# Patient Record
Sex: FEMALE | Race: WHITE | NOT HISPANIC OR LATINO | Employment: FULL TIME | URBAN - METROPOLITAN AREA
[De-identification: names, ages, dates, MRNs, and addresses within clinical notes are randomized per-mention and may not be internally consistent; named-entity substitution may affect disease eponyms.]

---

## 2017-10-03 ENCOUNTER — OFFICE VISIT (OUTPATIENT)
Dept: URGENT CARE | Facility: CLINIC | Age: 52
End: 2017-10-03
Payer: COMMERCIAL

## 2017-10-03 VITALS
DIASTOLIC BLOOD PRESSURE: 90 MMHG | SYSTOLIC BLOOD PRESSURE: 150 MMHG | RESPIRATION RATE: 28 BRPM | WEIGHT: 293 LBS | HEART RATE: 111 BPM | TEMPERATURE: 99 F | BODY MASS INDEX: 51.91 KG/M2 | HEIGHT: 63 IN | OXYGEN SATURATION: 92 %

## 2017-10-03 DIAGNOSIS — J45.909 MILD ASTHMA WITHOUT COMPLICATION, UNSPECIFIED WHETHER PERSISTENT: ICD-10-CM

## 2017-10-03 DIAGNOSIS — J40 BRONCHITIS: ICD-10-CM

## 2017-10-03 DIAGNOSIS — I10 ESSENTIAL HYPERTENSION: ICD-10-CM

## 2017-10-03 DIAGNOSIS — H66.001 ACUTE SUPPURATIVE OTITIS MEDIA OF RIGHT EAR WITHOUT SPONTANEOUS RUPTURE OF TYMPANIC MEMBRANE, RECURRENCE NOT SPECIFIED: ICD-10-CM

## 2017-10-03 DIAGNOSIS — Z76.0 MEDICATION REFILL: ICD-10-CM

## 2017-10-03 PROCEDURE — 99204 OFFICE O/P NEW MOD 45 MIN: CPT | Performed by: PHYSICIAN ASSISTANT

## 2017-10-03 RX ORDER — ALBUTEROL SULFATE 90 UG/1
2 AEROSOL, METERED RESPIRATORY (INHALATION) EVERY 6 HOURS PRN
Qty: 8.5 G | Refills: 0 | Status: SHIPPED | OUTPATIENT
Start: 2017-10-03 | End: 2017-10-16 | Stop reason: SDUPTHER

## 2017-10-03 RX ORDER — TRIAMTERENE AND HYDROCHLOROTHIAZIDE 37.5; 25 MG/1; MG/1
1 CAPSULE ORAL EVERY MORNING
Qty: 30 CAP | Refills: 0 | Status: SHIPPED | OUTPATIENT
Start: 2017-10-03 | End: 2017-10-16 | Stop reason: SDUPTHER

## 2017-10-03 RX ORDER — CARVEDILOL 3.12 MG/1
3.12 TABLET ORAL 2 TIMES DAILY WITH MEALS
COMMUNITY
End: 2017-10-16

## 2017-10-03 RX ORDER — LISINOPRIL 5 MG/1
5 TABLET ORAL DAILY
COMMUNITY
End: 2017-10-16

## 2017-10-03 RX ORDER — LISINOPRIL 5 MG/1
5 TABLET ORAL DAILY
Qty: 30 TAB | Refills: 0 | Status: SHIPPED | OUTPATIENT
Start: 2017-10-03 | End: 2017-10-16 | Stop reason: SDUPTHER

## 2017-10-03 RX ORDER — LISINOPRIL 10 MG/1
10 TABLET ORAL DAILY
Qty: 30 TAB | Refills: 0 | Status: SHIPPED | OUTPATIENT
Start: 2017-10-03 | End: 2017-10-16

## 2017-10-03 RX ORDER — TRIAMTERENE AND HYDROCHLOROTHIAZIDE 37.5; 25 MG/1; MG/1
1 CAPSULE ORAL EVERY MORNING
COMMUNITY
End: 2017-10-16

## 2017-10-03 RX ORDER — AZITHROMYCIN 250 MG/1
TABLET, FILM COATED ORAL
Qty: 6 TAB | Refills: 0 | Status: SHIPPED | OUTPATIENT
Start: 2017-10-03 | End: 2017-10-16

## 2017-10-03 RX ORDER — CARVEDILOL 3.12 MG/1
3.12 TABLET ORAL 2 TIMES DAILY WITH MEALS
Qty: 60 TAB | Refills: 0 | Status: SHIPPED | OUTPATIENT
Start: 2017-10-03 | End: 2017-10-16 | Stop reason: SDUPTHER

## 2017-10-03 ASSESSMENT — ENCOUNTER SYMPTOMS
ABDOMINAL PAIN: 0
VOMITING: 0
EYE DISCHARGE: 0
DIARRHEA: 0
CHILLS: 0
SINUS PAIN: 1
EYE REDNESS: 0
SORE THROAT: 1
HEADACHES: 1
COUGH: 1
DIZZINESS: 0
FEVER: 0
WHEEZING: 1
SHORTNESS OF BREATH: 0
SPUTUM PRODUCTION: 0
RHINORRHEA: 1
MYALGIAS: 0
TINGLING: 0
NECK PAIN: 0

## 2017-10-03 NOTE — PROGRESS NOTES
"Subjective:      Salome Garces is a 52 y.o. female who presents with Chest Pain (tightness); Headache; Cough; and Medication Refill (lisinopril)            Pt is 53 y/o female who presents with recent cough and congestion- worsening for the last 5-6 days. Pt. Reports slight wheezing at night. She has several ill contacts at work.       Of note pt. Has significanat PMH- enlarged heart with murmur- denies any CHF. She has been without her diuretic meds for over 6 months. She denies any significant leg swelling at this time. She reports that she also needs a refill on her Lisinopril for HTN along with Coreg for \"enlarged heart\".   She reports hx of asthma along with sleep apnea- she wears a CPAP at night with 2 L. Of oxygen.   She has been using this at night without difficulty.         URI    This is a new problem. Episode onset: 5-6 days ago. The problem has been gradually worsening. There has been no fever. Associated symptoms include congestion, coughing, ear pain, headaches, a plugged ear sensation, rhinorrhea, sinus pain, a sore throat and wheezing. Pertinent negatives include no abdominal pain, chest pain, diarrhea, dysuria, neck pain, rash or vomiting. Associated symptoms comments: Pos. For chest tightness.   . Treatments tried: Essential oils, CPAP with O2.  The treatment provided mild relief.       Review of Systems   Constitutional: Positive for malaise/fatigue. Negative for chills and fever.   HENT: Positive for congestion, ear pain, rhinorrhea and sore throat. Negative for ear discharge.    Eyes: Negative for discharge and redness.   Respiratory: Positive for cough and wheezing. Negative for sputum production and shortness of breath.    Cardiovascular: Negative for chest pain and leg swelling.   Gastrointestinal: Negative for abdominal pain, diarrhea and vomiting.   Genitourinary: Negative for dysuria and urgency.   Musculoskeletal: Negative for myalgias and neck pain.   Skin: Negative for itching and rash. " "  Neurological: Positive for headaches. Negative for dizziness and tingling.          Objective:     /90   Pulse (!) 111   Temp 37.2 °C (99 °F)   Resp (!) 28   Ht 1.588 m (5' 2.5\")   Wt (!) 136.1 kg (300 lb)   SpO2 92%   Breastfeeding? No   BMI 54.00 kg/m²    PMH:  has no past medical history on file.  MEDS:   Current Outpatient Prescriptions:   •  lisinopril (PRINIVIL) 5 MG Tab, Take 5 mg by mouth every day., Disp: , Rfl:   •  triamterene/hctz (MAXZIDE-25/DYAZIDE) 37.5-25 MG Cap, Take 1 Cap by mouth every morning., Disp: , Rfl:   •  carvedilol (COREG) 3.125 MG Tab, Take 3.125 mg by mouth 2 times a day, with meals., Disp: , Rfl:   •  triamterene/hctz (MAXZIDE-25/DYAZIDE) 37.5-25 MG Cap, Take 1 Cap by mouth every morning., Disp: 30 Cap, Rfl: 0  •  carvedilol (COREG) 3.125 MG Tab, Take 1 Tab by mouth 2 times a day, with meals., Disp: 60 Tab, Rfl: 0  •  lisinopril (PRINIVIL) 10 MG Tab, Take 1 Tab by mouth every day., Disp: 30 Tab, Rfl: 0  •  lisinopril (PRINIVIL) 5 MG Tab, Take 1 Tab by mouth every day., Disp: 30 Tab, Rfl: 0  •  azithromycin (ZITHROMAX) 250 MG Tab, Take 2 tabs daily, then 1 tabs daily til completed, Disp: 6 Tab, Rfl: 0  •  albuterol 108 (90 Base) MCG/ACT Aero Soln inhalation aerosol, Inhale 2 Puffs by mouth every 6 hours as needed for Shortness of Breath., Disp: 8.5 g, Rfl: 0  ALLERGIES: No Known Allergies  SURGHX: No past surgical history on file.  SOCHX:  reports that she has never smoked. She has never used smokeless tobacco.  FH: Family history was reviewed, no pertinent findings to report    Physical Exam   Constitutional: She is oriented to person, place, and time. She appears well-developed and well-nourished.   HENT:   Head: Normocephalic and atraumatic.   Mouth/Throat: No oropharyngeal exudate.   Ears- Right TM with Erythema and noted purulent middle ear effusion. TM is intact.   Pos. PND, with slight erythema- without tonsillar edema or exudate. Upper plate noted.   Mild " discharge noted bilaterally- to nares.      Eyes: EOM are normal. Pupils are equal, round, and reactive to light.   Neck: Normal range of motion. Neck supple.   Cardiovascular: Normal rate and regular rhythm.    Murmur heard.  Pulse slower on exam than on triage- closer to 90.    Pulmonary/Chest: Effort normal and breath sounds normal. No respiratory distress.   Diminished at the bases.    Musculoskeletal: Normal range of motion. She exhibits no edema or tenderness.   Lymphadenopathy:     She has no cervical adenopathy.   Neurological: She is alert and oriented to person, place, and time. Coordination normal.   Skin: Skin is warm. Capillary refill takes 2 to 3 seconds. No rash noted.   Psychiatric: She has a normal mood and affect. Her behavior is normal. Judgment and thought content normal.   Vitals reviewed.              Assessment/Plan:     1. Essential hypertension  - triamterene/hctz (MAXZIDE-25/DYAZIDE) 37.5-25 MG Cap; Take 1 Cap by mouth every morning.  Dispense: 30 Cap; Refill: 0  - carvedilol (COREG) 3.125 MG Tab; Take 1 Tab by mouth 2 times a day, with meals.  Dispense: 60 Tab; Refill: 0  - lisinopril (PRINIVIL) 10 MG Tab; Take 1 Tab by mouth every day.  Dispense: 30 Tab; Refill: 0  - lisinopril (PRINIVIL) 5 MG Tab; Take 1 Tab by mouth every day.  Dispense: 30 Tab; Refill: 0  She is to start  On BP meds ASAP- monitor for symptoms- currently without change to vision, current HA, or CP.     2. Medication refill  - lisinopril (PRINIVIL) 5 MG Tab; Take 1 Tab by mouth every day.  Dispense: 30 Tab; Refill: 0    3. Bronchitis  - azithromycin (ZITHROMAX) 250 MG Tab; Take 2 tabs daily, then 1 tabs daily til completed  Dispense: 6 Tab; Refill: 0  - albuterol 108 (90 Base) MCG/ACT Aero Soln inhalation aerosol; Inhale 2 Puffs by mouth every 6 hours as needed for Shortness of Breath.  Dispense: 8.5 g; Refill: 0    4. Acute suppurative otitis media of right ear without spontaneous rupture of tympanic membrane,  recurrence not specified  - azithromycin (ZITHROMAX) 250 MG Tab; Take 2 tabs daily, then 1 tabs daily til completed  Dispense: 6 Tab; Refill: 0    5. Mild asthma without complication, unspecified whether persistent  - albuterol 108 (90 Base) MCG/ACT Aero Soln inhalation aerosol; Inhale 2 Puffs by mouth every 6 hours as needed for Shortness of Breath.  Dispense: 8.5 g; Refill: 0    At this time will cover her Bronchitis and OM with Azithromycin- she denies any renal or liver issues.   Pt. Was given appt. With PCP- 10/16 to have follow up and establish care.   Pt. Is to utilize albuterol for chest tightness and wheezing- none heard on exam today.   Avoid night time dairy,  Continue use of night time home O2_ I do suspect that due to habitus/sleep apnea/ need to utilize night time patient's baseline O2 I suspect is in the lower 90's- pt. Reports hx of this in the past. Without evidence of PNE, without orthopnea or increased edema- Acute CHF is unlikely.   Patient given precautionary s/sx that mandate immediate follow up and evaluation in the ED. Advised of risks of not doing so.    DDX, Supportive care, and indications for immediate follow-up discussed with patient.    Instructed to return to clinic or nearest emergency department if we are not available for any change in condition, further concerns, or worsening of symptoms.    The patient demonstrated a good understanding and agreed with the treatment plan.

## 2017-10-03 NOTE — LETTER
October 3, 2017         Patient: Salome Garces   YOB: 1965   Date of Visit: 10/3/2017           To Whom it May Concern:    Salome Garces was seen in my clinic on 10/3/2017. Please excuse this patient from work today due to recent illness, she may return on Thursday 10/5 only if symptoms have improved.     If you have any questions or concerns, please don't hesitate to call.        Sincerely,           Epifanio Bedoya P.A.-C.  Electronically Signed

## 2017-10-16 ENCOUNTER — HOSPITAL ENCOUNTER (OUTPATIENT)
Dept: LAB | Facility: MEDICAL CENTER | Age: 52
End: 2017-10-16
Attending: NURSE PRACTITIONER
Payer: COMMERCIAL

## 2017-10-16 ENCOUNTER — OFFICE VISIT (OUTPATIENT)
Dept: MEDICAL GROUP | Facility: PHYSICIAN GROUP | Age: 52
End: 2017-10-16
Payer: COMMERCIAL

## 2017-10-16 VITALS
DIASTOLIC BLOOD PRESSURE: 70 MMHG | OXYGEN SATURATION: 94 % | SYSTOLIC BLOOD PRESSURE: 126 MMHG | BODY MASS INDEX: 51.91 KG/M2 | RESPIRATION RATE: 18 BRPM | WEIGHT: 293 LBS | TEMPERATURE: 98.7 F | HEIGHT: 63 IN | HEART RATE: 76 BPM

## 2017-10-16 DIAGNOSIS — Z12.11 ENCOUNTER FOR SCREENING COLONOSCOPY: ICD-10-CM

## 2017-10-16 DIAGNOSIS — J45.20 MILD INTERMITTENT ASTHMA WITHOUT COMPLICATION: ICD-10-CM

## 2017-10-16 DIAGNOSIS — R01.1 MURMUR: ICD-10-CM

## 2017-10-16 DIAGNOSIS — I10 ESSENTIAL HYPERTENSION: ICD-10-CM

## 2017-10-16 DIAGNOSIS — G47.33 OBSTRUCTIVE SLEEP APNEA SYNDROME: ICD-10-CM

## 2017-10-16 DIAGNOSIS — I51.7 ENLARGED HEART: ICD-10-CM

## 2017-10-16 DIAGNOSIS — D50.8 IRON DEFICIENCY ANEMIA SECONDARY TO INADEQUATE DIETARY IRON INTAKE: ICD-10-CM

## 2017-10-16 DIAGNOSIS — Z12.39 SCREENING FOR BREAST CANCER: ICD-10-CM

## 2017-10-16 DIAGNOSIS — Z23 NEED FOR VACCINATION: ICD-10-CM

## 2017-10-16 DIAGNOSIS — E66.01 MORBID OBESITY WITH BMI OF 50.0-59.9, ADULT (HCC): ICD-10-CM

## 2017-10-16 LAB
ALBUMIN SERPL BCP-MCNC: 3.9 G/DL (ref 3.2–4.9)
ALBUMIN/GLOB SERPL: 1.1 G/DL
ALP SERPL-CCNC: 80 U/L (ref 30–99)
ALT SERPL-CCNC: 35 U/L (ref 2–50)
ANION GAP SERPL CALC-SCNC: 10 MMOL/L (ref 0–11.9)
AST SERPL-CCNC: 34 U/L (ref 12–45)
BASOPHILS # BLD AUTO: 1 % (ref 0–1.8)
BASOPHILS # BLD: 0.09 K/UL (ref 0–0.12)
BILIRUB SERPL-MCNC: 0.6 MG/DL (ref 0.1–1.5)
BUN SERPL-MCNC: 14 MG/DL (ref 8–22)
CALCIUM SERPL-MCNC: 9.6 MG/DL (ref 8.5–10.5)
CHLORIDE SERPL-SCNC: 97 MMOL/L (ref 96–112)
CHOLEST SERPL-MCNC: 180 MG/DL (ref 100–199)
CO2 SERPL-SCNC: 29 MMOL/L (ref 20–33)
CREAT SERPL-MCNC: 0.51 MG/DL (ref 0.5–1.4)
CREAT UR-MCNC: 121.7 MG/DL
EOSINOPHIL # BLD AUTO: 0.18 K/UL (ref 0–0.51)
EOSINOPHIL NFR BLD: 2 % (ref 0–6.9)
ERYTHROCYTE [DISTWIDTH] IN BLOOD BY AUTOMATED COUNT: 46.4 FL (ref 35.9–50)
GFR SERPL CREATININE-BSD FRML MDRD: >60 ML/MIN/1.73 M 2
GLOBULIN SER CALC-MCNC: 3.5 G/DL (ref 1.9–3.5)
GLUCOSE SERPL-MCNC: 79 MG/DL (ref 65–99)
HCT VFR BLD AUTO: 45 % (ref 37–47)
HDLC SERPL-MCNC: 54 MG/DL
HGB BLD-MCNC: 14.2 G/DL (ref 12–16)
IMM GRANULOCYTES # BLD AUTO: 0.03 K/UL (ref 0–0.11)
IMM GRANULOCYTES NFR BLD AUTO: 0.3 % (ref 0–0.9)
LDLC SERPL CALC-MCNC: 106 MG/DL
LYMPHOCYTES # BLD AUTO: 1.8 K/UL (ref 1–4.8)
LYMPHOCYTES NFR BLD: 19.8 % (ref 22–41)
MCH RBC QN AUTO: 29.4 PG (ref 27–33)
MCHC RBC AUTO-ENTMCNC: 31.6 G/DL (ref 33.6–35)
MCV RBC AUTO: 93.2 FL (ref 81.4–97.8)
MICROALBUMIN UR-MCNC: 0.7 MG/DL
MICROALBUMIN/CREAT UR: 6 MG/G (ref 0–30)
MONOCYTES # BLD AUTO: 0.52 K/UL (ref 0–0.85)
MONOCYTES NFR BLD AUTO: 5.7 % (ref 0–13.4)
NEUTROPHILS # BLD AUTO: 6.48 K/UL (ref 2–7.15)
NEUTROPHILS NFR BLD: 71.2 % (ref 44–72)
NRBC # BLD AUTO: 0 K/UL
NRBC BLD AUTO-RTO: 0 /100 WBC
PLATELET # BLD AUTO: 300 K/UL (ref 164–446)
PMV BLD AUTO: 11.3 FL (ref 9–12.9)
POTASSIUM SERPL-SCNC: 4.2 MMOL/L (ref 3.6–5.5)
PROT SERPL-MCNC: 7.4 G/DL (ref 6–8.2)
RBC # BLD AUTO: 4.83 M/UL (ref 4.2–5.4)
SODIUM SERPL-SCNC: 136 MMOL/L (ref 135–145)
TRIGL SERPL-MCNC: 100 MG/DL (ref 0–149)
WBC # BLD AUTO: 9.1 K/UL (ref 4.8–10.8)

## 2017-10-16 PROCEDURE — 90686 IIV4 VACC NO PRSV 0.5 ML IM: CPT | Performed by: NURSE PRACTITIONER

## 2017-10-16 PROCEDURE — 80061 LIPID PANEL: CPT

## 2017-10-16 PROCEDURE — 99214 OFFICE O/P EST MOD 30 MIN: CPT | Mod: 25 | Performed by: NURSE PRACTITIONER

## 2017-10-16 PROCEDURE — 90732 PPSV23 VACC 2 YRS+ SUBQ/IM: CPT | Performed by: NURSE PRACTITIONER

## 2017-10-16 PROCEDURE — 36415 COLL VENOUS BLD VENIPUNCTURE: CPT

## 2017-10-16 PROCEDURE — 90471 IMMUNIZATION ADMIN: CPT | Performed by: NURSE PRACTITIONER

## 2017-10-16 PROCEDURE — 82570 ASSAY OF URINE CREATININE: CPT

## 2017-10-16 PROCEDURE — 85025 COMPLETE CBC W/AUTO DIFF WBC: CPT

## 2017-10-16 PROCEDURE — 80053 COMPREHEN METABOLIC PANEL: CPT

## 2017-10-16 PROCEDURE — 90472 IMMUNIZATION ADMIN EACH ADD: CPT | Performed by: NURSE PRACTITIONER

## 2017-10-16 PROCEDURE — 82043 UR ALBUMIN QUANTITATIVE: CPT

## 2017-10-16 RX ORDER — ALBUTEROL SULFATE 90 UG/1
2 AEROSOL, METERED RESPIRATORY (INHALATION) EVERY 6 HOURS PRN
Qty: 8.5 G | Refills: 6 | Status: SHIPPED | OUTPATIENT
Start: 2017-10-16 | End: 2018-10-15 | Stop reason: SDUPTHER

## 2017-10-16 RX ORDER — LISINOPRIL 5 MG/1
5 TABLET ORAL DAILY
Qty: 90 TAB | Refills: 3 | Status: SHIPPED | OUTPATIENT
Start: 2017-10-16 | End: 2018-01-15 | Stop reason: SDUPTHER

## 2017-10-16 RX ORDER — TRIAMTERENE AND HYDROCHLOROTHIAZIDE 37.5; 25 MG/1; MG/1
1 CAPSULE ORAL EVERY MORNING
Qty: 90 CAP | Refills: 3 | Status: SHIPPED | OUTPATIENT
Start: 2017-10-16 | End: 2018-10-15 | Stop reason: SDUPTHER

## 2017-10-16 RX ORDER — ALBUTEROL SULFATE 90 UG/1
2 AEROSOL, METERED RESPIRATORY (INHALATION) EVERY 6 HOURS PRN
Qty: 8.5 G | Refills: 0 | Status: SHIPPED | OUTPATIENT
Start: 2017-10-16 | End: 2017-10-16 | Stop reason: SDUPTHER

## 2017-10-16 RX ORDER — CARVEDILOL 3.12 MG/1
3.12 TABLET ORAL 2 TIMES DAILY WITH MEALS
Qty: 180 TAB | Refills: 3 | Status: SHIPPED | OUTPATIENT
Start: 2017-10-16 | End: 2017-12-04

## 2017-10-16 RX ORDER — TRIAMTERENE AND HYDROCHLOROTHIAZIDE 37.5; 25 MG/1; MG/1
1 CAPSULE ORAL EVERY MORNING
Qty: 30 CAP | Refills: 0 | Status: SHIPPED | OUTPATIENT
Start: 2017-10-16 | End: 2017-10-16 | Stop reason: SDUPTHER

## 2017-10-16 RX ORDER — LISINOPRIL 5 MG/1
5 TABLET ORAL DAILY
Qty: 30 TAB | Refills: 0 | Status: SHIPPED | OUTPATIENT
Start: 2017-10-16 | End: 2017-10-16 | Stop reason: SDUPTHER

## 2017-10-16 RX ORDER — CARVEDILOL 3.12 MG/1
3.12 TABLET ORAL 2 TIMES DAILY WITH MEALS
Qty: 60 TAB | Refills: 0 | Status: SHIPPED | OUTPATIENT
Start: 2017-10-16 | End: 2017-10-16 | Stop reason: SDUPTHER

## 2017-10-16 ASSESSMENT — PAIN SCALES - GENERAL: PAINLEVEL: NO PAIN

## 2017-10-16 ASSESSMENT — PATIENT HEALTH QUESTIONNAIRE - PHQ9: CLINICAL INTERPRETATION OF PHQ2 SCORE: 0

## 2017-10-16 NOTE — ASSESSMENT & PLAN NOTE
Chronic in nature. Stable. Patient uses CPAP with oxygen. Patient has not had recent follow-up with the provider regarding this issue patient has not seen pulmonology in the past. Patient does not have a record of her recent sleep study. Referral to pulmonology.

## 2017-10-16 NOTE — PROGRESS NOTES
Chief Complaint   Patient presents with   • Establish Care     New Patient    • Medication Refill       HISTORY OF THE PRESENT ILLNESS: This is a 52 y.o. female new patient to our practice. This pleasant patient is here todayTo establish care.    Essential hypertension  Chronic in nature. Stable. BP today is 126/70. Patient denies chest pain, palpitations, dizziness, SOB, headaches. Continues taking lisinopril, triamterene/HCTZ, carvedilol, states that she isn't having side effects.     Enlarged heart  Chronic in nature. Stable. Patient is unsure of exact enlargement, plan to obtain records.     Mild intermittent asthma without complication  Chronic in nature. Stable. Using albuterol inhaler. Denies current SOB, cough, or nighttime waking.     Iron deficiency anemia secondary to inadequate dietary iron intake  Chronic in nature. Stable. Takes OTC iron daily.     Murmur  Chronic in nature. Stable. Will obtain records, patient is uncertain what type of murmur.     Obstructive sleep apnea syndrome  Chronic in nature. Stable. Patient uses CPAP with oxygen. Patient has not had recent follow-up with the provider regarding this issue patient has not seen pulmonology in the past. Patient does not have a record of her recent sleep study. Referral to pulmonology.      Past Medical History:   Diagnosis Date   • Allergy    • Asthma    • Enlarged heart    • Hypertension    • Sleep apnea        Past Surgical History:   Procedure Laterality Date   • CARDIAC CATH, RIGHT/LEFT HEART     • PRIMARY C SECTION     • REPEAT C SECTION     • TUBAL COAGULATION LAPAROSCOPIC BILATERAL         Family Status   Relation Status   • Mother    • Father    • Maternal Aunt    • Maternal Grandmother      Family History   Problem Relation Age of Onset   • Lung Disease Mother      COPD    • Arthritis Mother    • Psychiatry Mother      depression, suicide   • Cancer Father    • Other Father      hypoglycemia   • Heart Disease Maternal Aunt  "64   • Cancer Maternal Aunt      breast cancer   • Heart Disease Maternal Grandmother      pacer       Social History   Substance Use Topics   • Smoking status: Never Smoker   • Smokeless tobacco: Never Used   • Alcohol use No       Allergies: Celebrex [celecoxib] and Penicillins    Current Outpatient Prescriptions Ordered in [facility]   Medication Sig Dispense Refill   • albuterol 108 (90 Base) MCG/ACT Aero Soln inhalation aerosol Inhale 2 Puffs by mouth every 6 hours as needed for Shortness of Breath. 8.5 g 6   • lisinopril (PRINIVIL) 5 MG Tab Take 1 Tab by mouth every day. 90 Tab 3   • carvedilol (COREG) 3.125 MG Tab Take 1 Tab by mouth 2 times a day, with meals. 180 Tab 3   • triamterene/hctz (MAXZIDE-25/DYAZIDE) 37.5-25 MG Cap Take 1 Cap by mouth every morning. 90 Cap 3     No current Epic-ordered facility-administered medications on file.        Review of Systems   Constitutional:  Negative for fever, chills, weight loss and malaise/fatigue.   HENT:  Negative for ear pain, nosebleeds, congestion, sore throat and neck pain.    Eyes:  Negative for blurred vision.   Respiratory:  Negative for cough, sputum production, shortness of breath and wheezing.    Cardiovascular:  Negative for chest pain, palpitations, orthopnea and leg swelling.   Gastrointestinal:  Negative for heartburn, nausea, vomiting and abdominal pain.   Genitourinary:  Negative for dysuria, urgency and frequency.   Musculoskeletal:  Negative for myalgias, back pain and joint pain.   Skin:  Negative for rash and itching.   Neurological:  Negative for dizziness, tingling, tremors, sensory change, focal weakness and headaches.   Endo/Heme/Allergies:  Does not bruise/bleed easily.   Psychiatric/Behavioral:  Negative for depression, anxiety, or memory loss.     All other systems reviewed and are negative except as in HPI.    Exam: Blood pressure 126/70, pulse 76, temperature 37.1 °C (98.7 °F), resp. rate 18, height 1.588 m (5' 2.52\"), weight (!) 136.5 kg " (301 lb), last menstrual period 09/20/2017, SpO2 94 %, not currently breastfeeding.  General:  Normal appearing. No distress.  HEENT:  Normocephalic. Eyes conjunctiva clear lids without ptosis, pupils equal and reactive to light accommodation, ears normal shape and contour, canals are clear bilaterally, tympanic membranes are benign, nasal mucosa benign, oropharynx is without erythema, edema or exudates.   Neck:  Supple without JVD or bruit. Thyroid is not enlarged.  Pulmonary:  Clear to ausculation.  Normal effort. No rales, ronchi, or wheezing.  Cardiovascular:  Regular rate and rhythm with murmur. Carotid and radial pulses are intact and equal bilaterally.  Abdomen:  Soft, nontender, nondistended. Normal bowel sounds. Liver and spleen are not palpable. Exam limited by body habitus.  Neurologic:  Grossly nonfocal  Lymph:  No cervical, supraclavicular or axillary lymph nodes are palpable  Skin:  Warm and dry.  No obvious lesions.  Musculoskeletal:  Normal gait. No extremity cyanosis, clubbing, or edema.  Psych:  Normal mood and affect. Alert and oriented x3. Judgment and insight is normal.    PLAN:    1. Need for vaccination  - Flu Quad Inj >3 Year Pre-Filled (Preservative Free)  - PneumoVax PPV23 =>4yo    2. Mild intermittent asthma without complication  Continue using albuterol inhaler as needed.  Counseled patient regarding exacerbation, went to be seen.  Discussed PFTs patient refuses at this time.  - PneumoVax PPV23 =>4yo  - albuterol 108 (90 Base) MCG/ACT Aero Soln inhalation aerosol; Inhale 2 Puffs by mouth every 6 hours as needed for Shortness of Breath.  Dispense: 8.5 g; Refill: 6    3. Essential hypertension  Continue current medications. Labs are ordered for annual assessment.  Plan to obtain records from previous provider.  - COMP METABOLIC PANEL; Future  - LIPID PROFILE; Future  - MICROALBUMIN CREAT RATIO URINE; Future  - lisinopril (PRINIVIL) 5 MG Tab; Take 1 Tab by mouth every day.  Dispense: 90 Tab;  Refill: 3  - carvedilol (COREG) 3.125 MG Tab; Take 1 Tab by mouth 2 times a day, with meals.  Dispense: 180 Tab; Refill: 3  - triamterene/hctz (MAXZIDE-25/DYAZIDE) 37.5-25 MG Cap; Take 1 Cap by mouth every morning.  Dispense: 90 Cap; Refill: 3    4. Enlarged heart  To obtain records from previous provider including previous echo patient may need to be followed by cardiology but has not seen a cardiologist in the past.    5. Morbid obesity with BMI of 50.0-59.9, adult (CMS-[facility])  - Patient identified as having weight management issue.  Appropriate orders and counseling given.    6. Iron deficiency anemia secondary to inadequate dietary iron intake  Chronic in nature. Patient is taking over-the-counter iron continue current plan of care and repeat labs.  - CBC WITH DIFFERENTIAL; Future    7. Murmur  Plan to obtain records from previous provider.    8. Screening for breast cancer  - MA-SCREEN MAMMO W/CAD-BILAT    9. Encounter for screening colonoscopy  - REFERRAL TO GI FOR COLONOSCOPY    10. Obstructive sleep apnea syndrome  Continue CPAP, oxygen at night.  Obtain records from previous provider.  - REFERRAL TO PULMONOLOGY    She will follow up in 6 months for short appointment or sooner as needed. Will obtain records to determine if patient is due for Pap smear at this time. Patient is encouraged to be seen in the emergency room for chest pain, palpitations, shortness of breath, dizziness, severe abdominal pain or other concerning symptoms.    Please note that this dictation was created using voice recognition software. I have made every reasonable attempt to correct obvious errors, but I expect that there are errors of grammar and possibly content that I did not discover before finalizing the note.      Assessment/Plan  1. Need for vaccination  Flu Quad Inj >3 Year Pre-Filled (Preservative Free)    PneumoVax PPV23 =>4yo   2. Mild intermittent asthma without complication  PneumoVax PPV23 =>4yo    albuterol 108 (90 Base)  MCG/ACT Aero Soln inhalation aerosol    DISCONTINUED: albuterol 108 (90 Base) MCG/ACT Aero Soln inhalation aerosol   3. Essential hypertension  COMP METABOLIC PANEL    LIPID PROFILE    MICROALBUMIN CREAT RATIO URINE    lisinopril (PRINIVIL) 5 MG Tab    carvedilol (COREG) 3.125 MG Tab    triamterene/hctz (MAXZIDE-25/DYAZIDE) 37.5-25 MG Cap    DISCONTINUED: lisinopril (PRINIVIL) 5 MG Tab    DISCONTINUED: carvedilol (COREG) 3.125 MG Tab    DISCONTINUED: triamterene/hctz (MAXZIDE-25/DYAZIDE) 37.5-25 MG Cap   4. Enlarged heart     5. Morbid obesity with BMI of 50.0-59.9, adult (CMS-[facility])  Patient identified as having weight management issue.  Appropriate orders and counseling given.   6. Iron deficiency anemia secondary to inadequate dietary iron intake  CBC WITH DIFFERENTIAL   7. Murmur     8. Screening for breast cancer  MA-SCREEN MAMMO W/CAD-BILAT   9. Encounter for screening colonoscopy  REFERRAL TO GI FOR COLONOSCOPY   10. Obstructive sleep apnea syndrome  REFERRAL TO PULMONOLOGY         I have placed the below orders and discussed them with an approved delegating provider. The MA is performing the below orders under the direction of Dr. Forbes.

## 2017-10-16 NOTE — ASSESSMENT & PLAN NOTE
Chronic in nature. Stable. BP today is 126/70. Patient denies chest pain, palpitations, dizziness, SOB, headaches. Continues taking lisinopril, triamterene/HCTZ, carvedilol, states that she isn't having side effects.

## 2017-10-16 NOTE — LETTER
Kliqed  GLENNY MarrP.RSABRA  [address]  [place] [address]  Fax: [phone]   Authorization for Release/Disclosure of   Protected Health Information   Name: SALOME GARCES : 1965 SSN: [id number]   Address: [address] [place] [address] [address]  [place] [address] Phone:    [phone] (home)    I authorize the entity listed below to release/disclose the PHI below to:   [facility]/Epifanio Myers A.P.R.YESY. and MIGUEL ÁNGEL Marr.P.RTRISTA.   Provider or Entity Name:  Dr.James Ordonez       Address   City, [facility], [facility]   Phone:      Fax:     Reason for request: continuity of care   Information to be released:    [  ] LAST COLONOSCOPY,  including any PATH REPORT and follow-up  [  ] LAST FIT/COLOGUARD RESULT [  ] LAST DEXA  [  ] LAST MAMMOGRAM  [  ] LAST PAP  [  ] LAST LABS [  ] RETINA EXAM REPORT  [  ] IMMUNIZATION RECORDS  [X  ] Release all info      [  ] Check here and initial the line next to each item to release ALL health information INCLUDING  _____ Care and treatment for drug and / or alcohol abuse  _____ HIV testing, infection status, or AIDS  _____ Genetic Testing    DATES OF SERVICE OR TIME PERIOD TO BE DISCLOSED: _____________  I understand and acknowledge that:  * This Authorization may be revoked at any time by you in writing, except if your health information has already been used or disclosed.  * Your health information that will be used or disclosed as a result of you signing this authorization could be re-disclosed by the recipient. If this occurs, your re-disclosed health information may no longer be protected by State or Federal laws.  * You may refuse to sign this Authorization. Your refusal will not affect your ability to obtain treatment.  * This Authorization becomes effective upon signing and will  on (date) __________.      If no date is indicated, this Authorization will  one (1) year from the signature date.    Name: Salome Garces    Signature:   Date:      10/16/2017       PLEASE FAX REQUESTED RECORDS BACK TO: [phone]

## 2017-10-16 NOTE — ASSESSMENT & PLAN NOTE
Chronic in nature. Stable. Using albuterol inhaler. Denies current SOB, cough, or nighttime waking.

## 2017-10-17 ENCOUNTER — TELEPHONE (OUTPATIENT)
Dept: MEDICAL GROUP | Facility: PHYSICIAN GROUP | Age: 52
End: 2017-10-17

## 2017-10-17 NOTE — TELEPHONE ENCOUNTER
----- Message from GLENNY MarrPAMAN sent at 10/17/2017  7:26 AM PDT -----  Please call pt and give lab results: CBC with no signs of anemia or infection. Electrolytes, kidney, liver function, proteins, fasting blood sugar all within normal limits. Liver profile is within normal limits there is slight elevation of LDL at 106 but this is not concerning at this time.

## 2017-11-13 ENCOUNTER — HOSPITAL ENCOUNTER (OUTPATIENT)
Dept: RADIOLOGY | Facility: MEDICAL CENTER | Age: 52
End: 2017-11-13
Attending: NURSE PRACTITIONER
Payer: COMMERCIAL

## 2017-11-13 PROCEDURE — G0202 SCR MAMMO BI INCL CAD: HCPCS

## 2017-11-20 ENCOUNTER — TELEPHONE (OUTPATIENT)
Dept: MEDICAL GROUP | Facility: PHYSICIAN GROUP | Age: 52
End: 2017-11-20

## 2017-11-20 ENCOUNTER — HOSPITAL ENCOUNTER (OUTPATIENT)
Dept: RADIOLOGY | Facility: MEDICAL CENTER | Age: 52
End: 2017-11-20

## 2017-11-20 NOTE — TELEPHONE ENCOUNTER
Phone Number Called: [phone] (home)       Message: Tried to call pt, unable to leave VM    Left Message for patient to call back: yes

## 2017-11-20 NOTE — TELEPHONE ENCOUNTER
----- Message from SOLO Marr sent at 11/20/2017  2:01 PM PST -----  Please call patient: Recent mammogram was normal. No signs of malignancy. I recommend re-checking in 1 year.

## 2017-12-02 DIAGNOSIS — I10 ESSENTIAL HYPERTENSION: ICD-10-CM

## 2017-12-04 RX ORDER — CARVEDILOL 3.12 MG/1
3.12 TABLET ORAL 2 TIMES DAILY WITH MEALS
Qty: 60 TAB | Refills: 0 | Status: SHIPPED | OUTPATIENT
Start: 2017-12-04 | End: 2018-01-04 | Stop reason: SDUPTHER

## 2017-12-28 DIAGNOSIS — Z01.810 PRE-OPERATIVE CARDIOVASCULAR EXAMINATION: ICD-10-CM

## 2017-12-28 DIAGNOSIS — Z01.812 PRE-OPERATIVE LABORATORY EXAMINATION: ICD-10-CM

## 2017-12-28 LAB
ANION GAP SERPL CALC-SCNC: 9 MMOL/L (ref 0–11.9)
BUN SERPL-MCNC: 12 MG/DL (ref 8–22)
CALCIUM SERPL-MCNC: 9.3 MG/DL (ref 8.5–10.5)
CHLORIDE SERPL-SCNC: 102 MMOL/L (ref 96–112)
CO2 SERPL-SCNC: 25 MMOL/L (ref 20–33)
CREAT SERPL-MCNC: 0.58 MG/DL (ref 0.5–1.4)
EKG IMPRESSION: NORMAL
ERYTHROCYTE [DISTWIDTH] IN BLOOD BY AUTOMATED COUNT: 47.2 FL (ref 35.9–50)
GFR SERPL CREATININE-BSD FRML MDRD: >60 ML/MIN/1.73 M 2
GLUCOSE SERPL-MCNC: 97 MG/DL (ref 65–99)
HCT VFR BLD AUTO: 40.5 % (ref 37–47)
HGB BLD-MCNC: 12.9 G/DL (ref 12–16)
MCH RBC QN AUTO: 29.6 PG (ref 27–33)
MCHC RBC AUTO-ENTMCNC: 31.9 G/DL (ref 33.6–35)
MCV RBC AUTO: 92.9 FL (ref 81.4–97.8)
PLATELET # BLD AUTO: 266 K/UL (ref 164–446)
PMV BLD AUTO: 10.9 FL (ref 9–12.9)
POTASSIUM SERPL-SCNC: 4 MMOL/L (ref 3.6–5.5)
RBC # BLD AUTO: 4.36 M/UL (ref 4.2–5.4)
SODIUM SERPL-SCNC: 136 MMOL/L (ref 135–145)
WBC # BLD AUTO: 7 K/UL (ref 4.8–10.8)

## 2017-12-28 PROCEDURE — 93010 ELECTROCARDIOGRAM REPORT: CPT | Performed by: INTERNAL MEDICINE

## 2017-12-28 PROCEDURE — 80048 BASIC METABOLIC PNL TOTAL CA: CPT

## 2017-12-28 PROCEDURE — 36415 COLL VENOUS BLD VENIPUNCTURE: CPT

## 2017-12-28 PROCEDURE — 85027 COMPLETE CBC AUTOMATED: CPT

## 2017-12-28 PROCEDURE — 93005 ELECTROCARDIOGRAM TRACING: CPT

## 2017-12-28 NOTE — OR NURSING
"Pre-admit appointment completed. \"Preparing for your procedure\" sheet given to pt along with verbal and written instructions. Pt instructed to continue regularly prescribed medications through the day before surgery. Pt instructed to take the following medications the day of surgery with a sip of water, per anesthesia protocol; coreg and albuterol MDI if needed.    Pt to bring MDI and CPAP DOS.    Dr Figueredo notified via email of procedure due to BMI=53.3 and other co-morbidities.   "

## 2017-12-28 NOTE — OR NURSING
Dr Figueredo reviewed patients medical history. Based upon information available, Dr Figueredo indicates OK to proceed with procedure.

## 2018-01-04 DIAGNOSIS — I10 ESSENTIAL HYPERTENSION: ICD-10-CM

## 2018-01-04 RX ORDER — CARVEDILOL 3.12 MG/1
3.12 TABLET ORAL 2 TIMES DAILY WITH MEALS
Qty: 180 TAB | Refills: 0 | Status: SHIPPED | OUTPATIENT
Start: 2018-01-04 | End: 2018-04-03 | Stop reason: SDUPTHER

## 2018-01-08 ENCOUNTER — HOSPITAL ENCOUNTER (OUTPATIENT)
Facility: MEDICAL CENTER | Age: 53
End: 2018-01-08
Attending: INTERNAL MEDICINE | Admitting: INTERNAL MEDICINE
Payer: COMMERCIAL

## 2018-01-08 VITALS
HEIGHT: 63 IN | RESPIRATION RATE: 14 BRPM | HEART RATE: 65 BPM | TEMPERATURE: 97.5 F | SYSTOLIC BLOOD PRESSURE: 117 MMHG | OXYGEN SATURATION: 94 % | BODY MASS INDEX: 51.91 KG/M2 | DIASTOLIC BLOOD PRESSURE: 65 MMHG | WEIGHT: 293 LBS

## 2018-01-08 LAB
B-HCG FREE SERPL-ACNC: <5 MIU/ML
IHCGL IHCGL: NEGATIVE MIU/ML
PATHOLOGY CONSULT NOTE: NORMAL

## 2018-01-08 PROCEDURE — 160009 HCHG ANES TIME/MIN: Performed by: INTERNAL MEDICINE

## 2018-01-08 PROCEDURE — 84702 CHORIONIC GONADOTROPIN TEST: CPT

## 2018-01-08 PROCEDURE — 160046 HCHG PACU - 1ST 60 MINS PHASE II: Performed by: INTERNAL MEDICINE

## 2018-01-08 PROCEDURE — 88305 TISSUE EXAM BY PATHOLOGIST: CPT

## 2018-01-08 PROCEDURE — 160204 HCHG ENDO MINUTES - 1ST 30 MINS LEVEL 5: Performed by: INTERNAL MEDICINE

## 2018-01-08 PROCEDURE — 160025 RECOVERY II MINUTES (STATS): Performed by: INTERNAL MEDICINE

## 2018-01-08 PROCEDURE — 160035 HCHG PACU - 1ST 60 MINS PHASE I: Performed by: INTERNAL MEDICINE

## 2018-01-08 PROCEDURE — 700111 HCHG RX REV CODE 636 W/ 250 OVERRIDE (IP)

## 2018-01-08 PROCEDURE — 700105 HCHG RX REV CODE 258: Performed by: INTERNAL MEDICINE

## 2018-01-08 PROCEDURE — 501629 HCHG TUBE, LUKI TRAP STERILE DISP: Performed by: INTERNAL MEDICINE

## 2018-01-08 PROCEDURE — 503192 HCHG GOLDPROBE, 10FR: Performed by: INTERNAL MEDICINE

## 2018-01-08 PROCEDURE — 160002 HCHG RECOVERY MINUTES (STAT): Performed by: INTERNAL MEDICINE

## 2018-01-08 PROCEDURE — 160048 HCHG OR STATISTICAL LEVEL 1-5: Performed by: INTERNAL MEDICINE

## 2018-01-08 PROCEDURE — 160036 HCHG PACU - EA ADDL 30 MINS PHASE I: Performed by: INTERNAL MEDICINE

## 2018-01-08 RX ORDER — SODIUM CHLORIDE, SODIUM LACTATE, POTASSIUM CHLORIDE, CALCIUM CHLORIDE 600; 310; 30; 20 MG/100ML; MG/100ML; MG/100ML; MG/100ML
INJECTION, SOLUTION INTRAVENOUS
Status: DISCONTINUED | OUTPATIENT
Start: 2018-01-08 | End: 2018-01-08 | Stop reason: HOSPADM

## 2018-01-08 RX ADMIN — SODIUM CHLORIDE, POTASSIUM CHLORIDE, SODIUM LACTATE AND CALCIUM CHLORIDE: 600; 310; 30; 20 INJECTION, SOLUTION INTRAVENOUS at 09:51

## 2018-01-08 ASSESSMENT — PAIN SCALES - GENERAL
PAINLEVEL_OUTOF10: 0

## 2018-01-08 NOTE — PROGRESS NOTES
Indication: + FIT   Risks, benefits, and alternatives were discussed with consenting person(s). Consenting person(s) were given an opportunity to ask questions and discuss other options. Risks including but not limited to failed or incomplete colonoscopy, ineffective therapy, perforation, infection, bleeding, missed lesion(s), missed diagnosis, cardiac and/or pulmonary event, aspiration, stroke, possible need for surgery, hospitalization possibly prolonged, discomfort, unsuccessful and/or incomplete procedure, possible need for repeat procedures and/or additional testings, damage to adjacent organs and/or vascular structures, medication reaction, disability, death, and other adverse events possibly life-threatening. Discussion was undertaken with Layman's terms. Consenting persons stated understanding and acceptance of these risks, and wished to proceed. Consent was given in clear state of mind.

## 2018-01-08 NOTE — OR NURSING
1158 pt [place] from [place] via [place], arouses upon verbal stim, on left side, noted o2 sats slightly low 90's, o2 mask replaced on 2 L. BS audible q 4 quads, pt denies any pain and noted respirations unlabored. no noted rectal bleedingRails up x2  on rney 1205 cpap per pt machine on 2 l. Nc 1225 cpap removed , pt awake alert, follows commands, resp. Unlabored sip water given. Denies pain , notified pt's friend of pt condition and she stated she will be here by 1330   1240  Pt comfortable, no change   1245 Pt fully awake,    1250 glasses given to pt to put on,   1255 report to stage 2   1305 to stage 2

## 2018-01-08 NOTE — OR NURSING
Patient to preop, allergies and NPO status verified, home medications reconciled, belongings secured, verbalizes understanding of pain scale, surgical site verified, IV access established.

## 2018-01-08 NOTE — DISCHARGE INSTRUCTIONS
COLONOSCOPY OR FLEXIBLE SIGMOIDOSCOPY  1. If you received a barium enema, take a mild laxative such as dulcolax to clean out the barium.   2. Drink plenty of fluids. Eat a diet high in fiber; such foods as whole-grain breads, fresh fruit and vegetables, nuts are recommended.  3. You may notice a few drops of blood with your first bowel movement. If you develop any large amount of bleeding, black stools, a fever, or abdominal pain, call your doctor right away.   4. Call your doctor for any questions or concerns, [phone]  5. Don't drive or drink alcohol for 24 hours. The medication you received will make you too drowsy.   6. No heavy lifting, ASA products or ASA x 5 days I    Prescriptions: none       You should call 911 if you develop problems with breathing or chest pain.    Nurse's Signature: ___________________________________    Discharge Education for patients on JUNIOR (Obstructive Sleep Apnea) Protocol    Prior to receiving sedation or anesthesia, we screen all patients for Obstructive Sleep Apnea.  During your screening, you were identified as having suspected, but not confirmed Obstructive Sleep Apnea(JUNIOR).    What is Obstructive Sleep Apnea?  Sleep apnea (AP-ne-ah) is a common disorder which involves breathing pauses that occur during sleep.  These can last from 10 seconds to a minute or longer.  Normal breathing resumes often with a loud snort or choking sound.    Sleep apnea occurs in all age groups and both genders but is more common in men and people over 40 years of age.  It has been estimated that as many as 18 million Americans have sleep apnea.  Most people who have sleep apnea don’t know they have it because it only occurs during sleep.  A family member and/or bed partner may first notice the signs of sleep apnea.  Sleep apnea is a chronic (ongoing) condition that disrupts the quality and quantity of your sleep repeatedly throughout the night.  This often results in excessive daytime sleepiness or  fatigue during the day.  It may also contribute to high blood pressure, heart problems, and complications following medications used for surgery and procedures.    To establish a definitive diagnosis, further testing from a specialist would be needed.  We recommend that you follow up with your primary care physician.    We recommend that you should be with an adult observer for at least 24 hours after your sedation/anesthesia.  If you have a CPAP machine, you should wear it during any sleep period (day or night) for the week following your procedure.  We encourage you to sleep on your side or in a sitting position, even with napping.  Lying flat on your back increases the risk of apnea and airway obstruction during your post procedure recovery period.    It is important to prevent over-sedation that could increase your risk for apnea.  Please take all pain medication as directed by your physician.  If you are not getting pain relief, please contact your physician to discuss possible approaches to relieving pain while minimizing medications that can affect your breathing and oxygen levels.

## 2018-01-08 NOTE — PROCEDURES
Patient Name: Salome Garces    Colonoscopy Procedure Note  Date of Procedure: 1/8/2018  Attending Physician: Dusty Gupta M.D.        Indications: +FIT  Previous colonoscopy: None  Instrument: Olympus Flexible Variable Stiffness Colonoscope  Sedation: Trevor Baker M.D./GÉNESIS     Procedure Details:    Colonoscopy  Pre-Anesthesia Assessment:  Prior to the procedure, a History and Physical was performed, and patient medications and allergies were reviewed. The patient’s tolerance of previous anesthesia was also reviewed. The risks and benefits of the procedure and the sedation options and risks were discussed with the patient including but not limited to infection, bleeding, aspiration, perforation, adverse medication reaction, missed diagnosis, and missed lesions. The patient verbalized understanding. All questions were answered, and informed consent was obtained.       After I obtained informed consent from the patient, the patient was placed in the left lateral position. Appropriate time-out protocol was followed: the correct patient, the correct procedure, and the correct equipment in the room were confirmed. Throughout the procedure, the patient’s blood pressure, pulse, and oxygen saturations were monitored continuously. Digital rectal exam was performed. The Olympus variable stiffness colonoscope was introduced through the anus and passed under direct vision. The scope was advanced to the cecum, identified by the appendiceal orifice and ileocecal valve. The colonscopy was performed without difficulty. The patient tolerated the procedure well. The quality of the bowel preparation was good.  Findings and interventions were performed and documented below. The endoscope was then removed and the procedure was terminated. There were no immediate postoperative complications.        Findings:    TI: normal  Cecum: normal  Ascending colon: normal. Retroflexion performed without gross abnormalities.  Transverse colon:  normal  Descending colon: normal  Sigmoid colon: normal  Rectum: small diminutive polyp 5mm in the rectum, removed with cold forceps biopsy. Retroflexion was performed with small grade 1 internal hemorrhoids.     Impressions:   1. One diminutive polyp 5mm in size in the rectum removed with cold forceps biopsy.  2. Grade 1 internal hemorrhoids      Recommendations:   1. Await pathology  2. Repeat FIT testing in 1 year.

## 2018-01-08 NOTE — OR NURSING
1333- Pt DC'd home to friend via w/c to private vehicle.  VSS.  Pt denied pain/nausea.  Pt and friend verbalized understanding of DC instructions.

## 2018-01-15 DIAGNOSIS — I10 ESSENTIAL HYPERTENSION: ICD-10-CM

## 2018-01-15 RX ORDER — LISINOPRIL 5 MG/1
5 TABLET ORAL DAILY
Qty: 90 TAB | Refills: 0 | Status: SHIPPED | OUTPATIENT
Start: 2018-01-15 | End: 2018-10-15 | Stop reason: SDUPTHER

## 2018-01-24 ENCOUNTER — SLEEP CENTER VISIT (OUTPATIENT)
Dept: SLEEP MEDICINE | Facility: MEDICAL CENTER | Age: 53
End: 2018-01-24
Payer: COMMERCIAL

## 2018-01-24 VITALS
HEART RATE: 72 BPM | SYSTOLIC BLOOD PRESSURE: 124 MMHG | OXYGEN SATURATION: 94 % | HEIGHT: 63 IN | BODY MASS INDEX: 51.91 KG/M2 | RESPIRATION RATE: 16 BRPM | WEIGHT: 293 LBS | DIASTOLIC BLOOD PRESSURE: 80 MMHG

## 2018-01-24 DIAGNOSIS — J45.20 MILD INTERMITTENT ASTHMA WITHOUT COMPLICATION: ICD-10-CM

## 2018-01-24 DIAGNOSIS — G47.33 OSA ON CPAP: ICD-10-CM

## 2018-01-24 DIAGNOSIS — E66.01 MORBID OBESITY WITH BMI OF 50.0-59.9, ADULT (HCC): ICD-10-CM

## 2018-01-24 DIAGNOSIS — I10 ESSENTIAL HYPERTENSION: ICD-10-CM

## 2018-01-24 PROCEDURE — 99203 OFFICE O/P NEW LOW 30 MIN: CPT | Performed by: INTERNAL MEDICINE

## 2018-01-24 NOTE — PROGRESS NOTES
Chief Complaint   Patient presents with   • New Patient     Sleep consultation       HPI: This patient is a 52 y.o. Female who is referred for sleep apnea establishment of care. She moved from [place] 2 years ago, where she was diagnosed with sleep apnea and has successfully been using CPAP therapy for years. Medical records have been requested. CPAP compliance card confirms 100% use of CPAP: 16 cm of water with 2 L oxygen bleed in. She uses CPAP average 7 hours nightly, with normal AHI of 1.7. She uses a fullface mask which she has duct taped as it is over 2 years old. She sleeps well on CPAP with resolution of snoring and daytime hypersomnolence. She awakens feeling rested and denies daytime hypersomnolence. Her [place] sleepiness score is 2. Her BMI is 52.  She has no issues with CPAP therapy, simply requires new CPAP supplies.    Past Medical History:   Diagnosis Date   • Allergy    • Anemia    • Asthma 12/28/2017    Inhalers PRN   • Bronchitis 10/2017   • Congestive heart failure (CMS-HCC) 2013    Heart cath done-was in [place] at [place]. NO further follow up and no cardiologist.   • Dental disorder 12/28/2017    Upper and lower dentures.   • DVT (deep venous thrombosis) (CMS-HCC)    • Enlarged heart    • [place] measles    • Hypertension    • Obesity    • Sleep apnea     CPAP With O2 @ 2L/NC.   • Snoring        Social History     Social History   • Marital status:      Spouse name: N/A   • Number of children: N/A   • Years of education: N/A     Occupational History   • Not on file.     Social History Main Topics   • Smoking status: Never Smoker   • Smokeless tobacco: Never Used   • Alcohol use No   • Drug use: No   • Sexual activity: Not on file     Other Topics Concern   • Not on file     Social History Narrative   • No narrative on file       Family History   Problem Relation Age of Onset   • Lung Disease Mother      COPD    • Arthritis Mother    • Psychiatry Mother      depression, suicide  "  • Cancer Father    • Other Father      hypoglycemia   • Heart Disease Maternal Aunt 64   • Cancer Maternal Aunt      breast cancer   • Heart Disease Maternal Grandmother      pacer       Current Outpatient Prescriptions on File Prior to Visit   Medication Sig Dispense Refill   • lisinopril (PRINIVIL) 5 MG Tab Take 1 Tab by mouth every day. 90 Tab 0   • carvedilol (COREG) 3.125 MG Tab TAKE 1 TAB BY MOUTH 2 TIMES A DAY, WITH MEALS. 180 Tab 0   • NON SPECIFIED Take  by mouth every day. ZEAL powder supplement for energy     • albuterol 108 (90 Base) MCG/ACT Aero Soln inhalation aerosol Inhale 2 Puffs by mouth every 6 hours as needed for Shortness of Breath. 8.5 g 6   • triamterene/hctz (MAXZIDE-25/DYAZIDE) 37.5-25 MG Cap Take 1 Cap by mouth every morning. 90 Cap 3     No current facility-administered medications on file prior to visit.        Allergies: Celebrex [celecoxib] and Penicillins    ROS:   Constitutional: Denies fevers, chills, night sweats, fatigue or weight loss  Eyes: Denies vision loss, pain, drainage, double vision  Ears, Nose, Throat: Denies earache, difficulty hearing, tinnitus, nasal congestion, hoarseness  Cardiovascular: Denies chest pain, tightness, palpitations, orthopnea, +LEedema  Respiratory: Denies shortness of breath, cough, wheezing, hemoptysis  Sleep: Denies daytime sleepiness, snoring, apneas, insomnia, morning headaches  GI: Denies heartburn, dysphagia, nausea, abdominal pain, diarrhea or constipation  : Denies frequent urination, hematuria, discharge or painful urination  Musculoskeletal: Denies back pain, painful joints, sore muscles  Neurological: Denies weakness or headaches  Skin: No rashes    Blood pressure 124/80, pulse 72, resp. rate 16, height 1.6 m (5' 3\"), weight (!) 134.3 kg (296 lb), SpO2 94 %.    Physical Exam:  Appearance: Well-nourished, well-developed, in no acute distress  HEENT: Normocephalic, atraumatic, white sclera, PERRLA, oropharynx clear, Mallampati 3  Neck: No " adenopathy or masses  Respiratory: no intercostal retractions or accessory muscle use  Lungs auscultation: Clear to auscultation bilaterally  Cardiovascular: Regular rate rhythm. No murmurs, rubs or gallops.  Trace LE edema  Abdomen: soft, nondistended  Gait: Normal  Digits: No clubbing, cyanosis  Motor: No focal deficits  Orientation: Oriented to time, person and place    Diagnosis:  1. JUNIOR on CPAP  DME MASK AND SUPPLIES   2. Morbid obesity with BMI of 50.0-59.9, adult ([facility])     3. Mild intermittent asthma without complication     4. Essential hypertension         Plan:  The [place], has successfully been using CPAP therapy for many years. CPAP compliance card shows excellent compliance and response to treatment. She requires replacement CPAP supplies.   Continue CPAP: 16 cm of water with 2 L oxygen bleed- in using a fullface mask.   We have requested medical records from [place].  Return in about 6 months (around 7/24/2018).

## 2018-02-22 ENCOUNTER — TELEPHONE (OUTPATIENT)
Dept: PULMONOLOGY | Facility: HOSPICE | Age: 53
End: 2018-02-22

## 2018-02-22 NOTE — TELEPHONE ENCOUNTER
Nikko from [facility] states [facility] is [facility] [place] [facility] get a [facility]  When he contacted the patient, she told him she has a copy herself.  I will fax the order and notes to Nikko and he will let me know if they DON'T obtain a copy

## 2018-02-22 NOTE — TELEPHONE ENCOUNTER
I received a call from both [facility] and patient inquiring about the status of her supplies for her PAP    I advised Nikko @ [facility] we are still waiting on a copy of her sleep study and he agreed this is needed for auth through the patients insurance    I left a detailed message for the patient with the above info and advised that I would refax the request for records to the MD office in [place] but she is welcome to call them herself to move things along.      Left patient my direct line to call with questions.  If patient calls I will offer the option of sending the order to a company that will allow her to self pay in the meantime.    Upon further review of the records we did get (verified there is no sleep study in the 105 pages sent to [facility] in [place]) I did find CMNs from [facility] in [place].  Spoke to Briseida at the local [facility] and she is going to try and get a copy from them

## 2018-04-03 DIAGNOSIS — I10 ESSENTIAL HYPERTENSION: ICD-10-CM

## 2018-04-03 RX ORDER — CARVEDILOL 3.12 MG/1
3.12 TABLET ORAL 2 TIMES DAILY WITH MEALS
Qty: 180 TAB | Refills: 0 | Status: SHIPPED | OUTPATIENT
Start: 2018-04-03 | End: 2018-07-08 | Stop reason: SDUPTHER

## 2018-04-16 ENCOUNTER — TELEPHONE (OUTPATIENT)
Dept: MEDICAL GROUP | Facility: PHYSICIAN GROUP | Age: 53
End: 2018-04-16

## 2018-04-16 ENCOUNTER — OFFICE VISIT (OUTPATIENT)
Dept: MEDICAL GROUP | Facility: PHYSICIAN GROUP | Age: 53
End: 2018-04-16
Payer: COMMERCIAL

## 2018-04-16 VITALS
BODY MASS INDEX: 51.91 KG/M2 | SYSTOLIC BLOOD PRESSURE: 138 MMHG | HEART RATE: 74 BPM | OXYGEN SATURATION: 93 % | DIASTOLIC BLOOD PRESSURE: 80 MMHG | RESPIRATION RATE: 16 BRPM | WEIGHT: 293 LBS | HEIGHT: 63 IN | TEMPERATURE: 97.3 F

## 2018-04-16 DIAGNOSIS — I10 ESSENTIAL HYPERTENSION: ICD-10-CM

## 2018-04-16 DIAGNOSIS — D50.8 IRON DEFICIENCY ANEMIA SECONDARY TO INADEQUATE DIETARY IRON INTAKE: ICD-10-CM

## 2018-04-16 DIAGNOSIS — E66.01 MORBID OBESITY WITH BMI OF 50.0-59.9, ADULT (HCC): ICD-10-CM

## 2018-04-16 DIAGNOSIS — N92.4 EXCESSIVE BLEEDING IN PREMENOPAUSAL PERIOD: ICD-10-CM

## 2018-04-16 DIAGNOSIS — N80.9 ENDOMETRIOSIS: ICD-10-CM

## 2018-04-16 DIAGNOSIS — J45.20 MILD INTERMITTENT ASTHMA WITHOUT COMPLICATION: ICD-10-CM

## 2018-04-16 DIAGNOSIS — G47.33 OBSTRUCTIVE SLEEP APNEA SYNDROME: ICD-10-CM

## 2018-04-16 PROBLEM — Z23 NEED FOR VACCINATION: Status: RESOLVED | Noted: 2017-10-16 | Resolved: 2018-04-16

## 2018-04-16 PROCEDURE — 99214 OFFICE O/P EST MOD 30 MIN: CPT | Performed by: NURSE PRACTITIONER

## 2018-04-16 ASSESSMENT — PAIN SCALES - GENERAL: PAINLEVEL: NO PAIN

## 2018-04-16 NOTE — ASSESSMENT & PLAN NOTE
Chronic in nature. Stable. Patient uses CPAP with oxygen. Patient is following up with pulmonology regarding this issue states that she has been having difficulty getting a new mask. But states that otherwise she is doing well.

## 2018-04-16 NOTE — PROGRESS NOTES
"Chief Complaint   Patient presents with   • Follow-Up     6 Month        HISTORY OF PRESENT ILLNESS: Patient is a 52 y.o. female established patient who presents today to discuss heavy periods, CPAP mask.    Essential hypertension  Chronic in nature. Stable. BP today 144/92 states that she is very stressed out related to moving and difficulty getting her mask fit. PAtient chest pain, palpitations, dizziness, SOB, headaches. No side effects from medication.     Excessive bleeding in premenopausal period  States periods have become irregular, heavier recently. Patient states that she does have some perimenopausal symptoms including hot flashes, night sweats. States these are not severe, and that she is \"managing\". States in November she had a very heavy period states she had to go home from work as she was bleeding through pads and less than one hour, sat on a plastic bag to go home. Since then periods have been 4-10 days, and states that they have all been very heavy. States that in July of '16 she did have one that lasted 20 days. States she does have a history of endometriosis.     Mild intermittent asthma without complication  Chronic in nature. Table. Patient is using her albuterol inhaler as needed states that she has been needing to use this less related to increased cardiovascular exercise climbing up and down the stairs to her new apartment. Patient has also lost 4 pounds and is very pleased. Patient states that she is not having any increased shortness of breath, nighttime awakening, cough.    Morbid obesity with BMI of 50.0-59.9, adult (HCC)  Chronic in nature. Stable. Patient has been increasing her exercise by cleaning up and down the stairs to her new apartment. Patient has lost 4 pounds. Patient states she is feeling a lot better and is breathing easier.    Iron deficiency anemia secondary to inadequate dietary iron intake  Chronic in nature. Stable. Patient continues to take OTC iron.    Obstructive " sleep apnea syndrome  Chronic in nature. Stable. Patient uses CPAP with oxygen. Patient is following up with pulmonology regarding this issue states that she has been having difficulty getting a new mask. But states that otherwise she is doing well.    Endometriosis  Chronic in nature. Patient states that she is unsure of current status. States that she was told in the past that it was severe and she would be unable to have children but states she does have 3 healthy children. Patient does have history of tubal ligation states that she does not have history of exploratory surgery or hysterectomy. Patient has had increased bleeding with periods, more severe cramps recently is requesting referral to gynecology to follow up on this issue.      Patient Active Problem List    Diagnosis Date Noted   • Excessive bleeding in premenopausal period 04/16/2018   • Endometriosis 04/16/2018   • Essential hypertension 10/16/2017   • Mild intermittent asthma without complication 10/16/2017   • Enlarged heart 10/16/2017   • Morbid obesity with BMI of 50.0-59.9, adult (HCC) 10/16/2017   • Iron deficiency anemia secondary to inadequate dietary iron intake 10/16/2017   • Murmur 10/16/2017   • Obstructive sleep apnea syndrome 10/16/2017       Allergies:Celebrex [celecoxib] and Penicillins    Current Outpatient Prescriptions   Medication Sig Dispense Refill   • carvedilol (COREG) 3.125 MG Tab TAKE 1 TAB BY MOUTH 2 TIMES A DAY, WITH MEALS. 180 Tab 0   • lisinopril (PRINIVIL) 5 MG Tab Take 1 Tab by mouth every day. 90 Tab 0   • NON SPECIFIED Take  by mouth every day. ZEAL powder supplement for energy     • albuterol 108 (90 Base) MCG/ACT Aero Soln inhalation aerosol Inhale 2 Puffs by mouth every 6 hours as needed for Shortness of Breath. 8.5 g 6   • triamterene/hctz (MAXZIDE-25/DYAZIDE) 37.5-25 MG Cap Take 1 Cap by mouth every morning. 90 Cap 3     No current facility-administered medications for this visit.        Social History  "  Substance Use Topics   • Smoking status: Never Smoker   • Smokeless tobacco: Never Used   • Alcohol use No       Family Status   Relation Status   • Mother    • Father    • Maternal Aunt    • Maternal Grandmother      Family History   Problem Relation Age of Onset   • Lung Disease Mother      COPD    • Arthritis Mother    • Psychiatry Mother      depression, suicide   • Cancer Father    • Other Father      hypoglycemia   • Heart Disease Maternal Aunt 64   • Cancer Maternal Aunt      breast cancer   • Heart Disease Maternal Grandmother      pacer       Review of Systems:   Constitutional:  Negative for fever, chills, weight loss and malaise/fatigue.   HEENT:  Negative for ear pain, nosebleeds, congestion, sore throat and neck pain.    Eyes:  Negative for blurred vision.   Respiratory:  Negative for cough, sputum production, shortness of breath and wheezing.    Cardiovascular:  Negative for chest pain, palpitations, orthopnea and leg swelling.   Gastrointestinal:  Negative for heartburn, nausea, vomiting and abdominal pain.   Genitourinary:  Negative for dysuria, urgency and frequency.   Musculoskeletal:  Negative for myalgias, back pain and joint pain.   Skin:  Negative for rash and itching.   Neurological:  Negative for dizziness, tingling, tremors, sensory change, focal weakness and headaches.   Endo/Heme/Allergies:  Does not bruise/bleed easily.   Psychiatric/Behavioral:  Negative for depression, suicidal ideas and memory loss.  The patient is not nervous/anxious and does not have insomnia.    All other systems reviewed and are negative except as in HPI.    Exam:  Blood pressure 144/92, pulse 74, temperature 36.3 °C (97.3 °F), resp. rate 16, height 1.6 m (5' 3\"), weight (!) 135.2 kg (298 lb), SpO2 93 %, not currently breastfeeding.  General:  Normal appearing. No distress.  HEENT:  Normocephalic. Eyes conjunctiva clear lids without ptosis, pupils equal and reactive to light accommodation, ears " normal shape and contour, canals are clear bilaterally, tympanic membranes are benign, nasal mucosa benign, oropharynx is without erythema, edema or exudates.   Neck:  Supple without JVD or bruit. Thyroid is not enlarged.  Pulmonary:  Clear to ausculation.  Normal effort. No rales, ronchi, or wheezing.  Cardiovascular:  Regular rate and rhythm with murmur. Carotid and radial pulses are intact and equal bilaterally.  Abdomen:  Soft, nontender, nondistended. Normal bowel sounds. Liver and spleen are not palpable. Exam limited by body habitus.  Neurologic:  Grossly nonfocal  Lymph:  No cervical, supraclavicular or axillary lymph nodes are palpable  Skin:  Warm and dry.  No obvious lesions.  Musculoskeletal:  Normal gait. No extremity cyanosis, clubbing, or edema.  Psych:  Normal mood and affect. Alert and oriented x3. Judgment and insight is normal.      PLAN:    1. Morbid obesity with BMI of 50.0-59.9, adult (HCC)  She will continue increased exercise as possible referral to health improvement program. Patient states she would like to work on gynecologic issues first.  - Patient identified as having weight management issue.  Appropriate orders and counseling given.    2. Essential hypertension  Continue current medication.    3. Excessive bleeding in premenopausal period  4. Endometriosis  Counseled regarding follow-up for worsening symptoms, dizziness.   Discussed possible need for u/s if theings worsen prior to seeing GYN.  - REFERRAL TO GYNECOLOGY    5. Mild intermittent asthma without complication  Continue current plan of care    6. Iron deficiency anemia secondary to inadequate dietary iron intake  Continue iron supplementation.    7. Obstructive sleep apnea syndrome  Continue follow-up with pulmonology.    Follow-up in 6 months or sooner as needed. Patient is encouraged to be seen in the emergency room for chest pain, palpitations, shortness of breath, dizziness, severe abdominal pain or other concerning  symptoms.    Please note that this dictation was created using voice recognition software. I have made every reasonable attempt to correct obvious errors, but I expect that there are errors of grammar and possibly content that I did not discover before finalizing the note.    Assessment/Plan:  1. Morbid obesity with BMI of 50.0-59.9, adult (HCC)  Patient identified as having weight management issue.  Appropriate orders and counseling given.   2. Essential hypertension     3. Excessive bleeding in premenopausal period  REFERRAL TO GYNECOLOGY   4. Endometriosis  REFERRAL TO GYNECOLOGY   5. Mild intermittent asthma without complication     6. Iron deficiency anemia secondary to inadequate dietary iron intake     7. Obstructive sleep apnea syndrome            I have placed the below orders and discussed them with an approved delegating provider. The MA is performing the below orders under the direction of Dr. Forbes.

## 2018-04-16 NOTE — TELEPHONE ENCOUNTER
Phone Number Called: [phone] (home)       Message: Called and spoke to patient. Patient is going to contact her Pulmonologist. Pulmonology needs to have patient so the sleep study and contact Yuliet Hunt

## 2018-04-16 NOTE — ASSESSMENT & PLAN NOTE
"States periods have become irregular, heavier recently. Patient states that she does have some perimenopausal symptoms including hot flashes, night sweats. States these are not severe, and that she is \"managing\". States in November she had a very heavy period states she had to go home from work as she was bleeding through pads and less than one hour, sat on a plastic bag to go home. Since then periods have been 4-10 days, and states that they have all been very heavy. States that in July of '16 she did have one that lasted 20 days. States she does have a history of endometriosis.   "

## 2018-04-16 NOTE — ASSESSMENT & PLAN NOTE
Chronic in nature. Stable. Patient has been increasing her exercise by cleaning up and down the stairs to her new apartment. Patient has lost 4 pounds. Patient states she is feeling a lot better and is breathing easier.

## 2018-04-16 NOTE — ASSESSMENT & PLAN NOTE
Chronic in nature. Stable. BP today 144/92 states that she is very stressed out related to moving and difficulty getting her mask fit. PAtient chest pain, palpitations, dizziness, SOB, headaches. No side effects from medication.

## 2018-04-16 NOTE — TELEPHONE ENCOUNTER
1. Caller Name: Salome Gacres                                           Call Back Number: [phone] (home)         Patient approves a detailed voicemail message: yes    Patient needs a new sleep order for supplies. Called and spoke to [facility].

## 2018-04-16 NOTE — ASSESSMENT & PLAN NOTE
Chronic in nature. Patient states that she is unsure of current status. States that she was told in the past that it was severe and she would be unable to have children but states she does have 3 healthy children. Patient does have history of tubal ligation states that she does not have history of exploratory surgery or hysterectomy. Patient has had increased bleeding with periods, more severe cramps recently is requesting referral to gynecology to follow up on this issue.

## 2018-04-16 NOTE — LETTER
April 16, 2018        Salome Garces  [address] [place] [address] [address]  [place] [address]      To Whom it May Concern:    Salome is taking a medication that causes frequent urination and will need to be allowed to take bathroom breaks hourly.    If you have any questions or concerns, please don't hesitate to call.        Sincerely,        Epifanio Myers, MIGUEL [email or web address].    Electronically Signed

## 2018-04-16 NOTE — ASSESSMENT & PLAN NOTE
Chronic in nature. Table. Patient is using her albuterol inhaler as needed states that she has been needing to use this less related to increased cardiovascular exercise climbing up and down the stairs to her new apartment. Patient has also lost 4 pounds and is very pleased. Patient states that she is not having any increased shortness of breath, nighttime awakening, cough.

## 2018-07-08 DIAGNOSIS — I10 ESSENTIAL HYPERTENSION: ICD-10-CM

## 2018-07-09 RX ORDER — CARVEDILOL 3.12 MG/1
3.12 TABLET ORAL 2 TIMES DAILY WITH MEALS
Qty: 180 TAB | Refills: 0 | Status: SHIPPED | OUTPATIENT
Start: 2018-07-09 | End: 2018-10-05 | Stop reason: SDUPTHER

## 2018-07-23 ENCOUNTER — TELEPHONE (OUTPATIENT)
Dept: SLEEP MEDICINE | Facility: MEDICAL CENTER | Age: 53
End: 2018-07-23

## 2018-07-23 ENCOUNTER — SLEEP CENTER VISIT (OUTPATIENT)
Dept: SLEEP MEDICINE | Facility: MEDICAL CENTER | Age: 53
End: 2018-07-23
Payer: COMMERCIAL

## 2018-07-23 VITALS
RESPIRATION RATE: 16 BRPM | HEART RATE: 66 BPM | WEIGHT: 293 LBS | DIASTOLIC BLOOD PRESSURE: 72 MMHG | OXYGEN SATURATION: 96 % | HEIGHT: 63 IN | SYSTOLIC BLOOD PRESSURE: 126 MMHG | BODY MASS INDEX: 51.91 KG/M2

## 2018-07-23 DIAGNOSIS — I51.7 ENLARGED HEART: ICD-10-CM

## 2018-07-23 DIAGNOSIS — E66.01 MORBID OBESITY WITH BMI OF 50.0-59.9, ADULT (HCC): ICD-10-CM

## 2018-07-23 DIAGNOSIS — J45.20 MILD INTERMITTENT ASTHMA WITHOUT COMPLICATION: ICD-10-CM

## 2018-07-23 DIAGNOSIS — G47.33 OBSTRUCTIVE SLEEP APNEA SYNDROME: Chronic | ICD-10-CM

## 2018-07-23 DIAGNOSIS — I10 ESSENTIAL HYPERTENSION: ICD-10-CM

## 2018-07-23 PROCEDURE — 99214 OFFICE O/P EST MOD 30 MIN: CPT | Performed by: NURSE PRACTITIONER

## 2018-07-23 NOTE — PROGRESS NOTES
Chief Complaint   Patient presents with   • Follow-Up     6 months        HPI:  Salome Garces is a 52 y.o. year old female here today for follow-up on JUNIOR.  Last office visit was 6 months ago.  She is transferring care from her doctor in [place].  She is diagnosed with sleep apnea 5 years ago and placed on CPAP.  Compliance card 6/23/2018 through 7/22/2018 indicates use of CPAP 16cm w/2L bleed in H2O nightly, average nightly use of 7 hours, minimal mask leaking and an overall AHI of 5.2.  She is to be set up with a local [employer] company at last office visit, but this never occurred because her sleep study was not obtained.  She notes being set up with [employer] who is very rude to her.  I reviewed her records thoroughly today and do not see her diagnostic sleep study.  We will attempt requesting this again.  We are unable to obtain it, we will start a home sleep study to qualify for supplies.  Today she denies any major changes in health.  She denies cardiac arrest or symptoms.  She obtained new mask and supplies by paying out of pocket.  She tolerates pressure well.  She feels well rested.  She denies morning headaches.    She has a history of mild asthma, obesity, and CHF.       ROS: As per HPI and otherwise negative if not stated.    Past Medical History:   Diagnosis Date   • Allergy    • Anemia    • Asthma 12/28/2017    Inhalers PRN   • Bronchitis 10/2017   • Congestive heart failure (HCC) 2013    Heart cath done-was [place] at [place]. NO further follow up and no cardiologist.   • Dental disorder 12/28/2017    Upper and lower dentures.   • DVT (deep venous thrombosis) (HCC)    • Enlarged heart    • Georgian measles    • Hypertension    • Obesity    • Sleep apnea     CPAP With O2 @ 2L/NC.   • Snoring        Past Surgical History:   Procedure Laterality Date   • COLONOSCOPY  1/8/2018    Procedure: COLONOSCOPY;  Surgeon: Dusty Gupta M.D.;  Location: SURGERY [facility];  Service: EUS   • COLONOSCOPY WITH  "BIOPSY  1/8/2018    Procedure: COLONOSCOPY WITH BIOPSY;  Surgeon: Dusty Gupta M.D.;  Location: SURGERY [facility];  Service: EUS   • CARDIAC CATH, RIGHT/LEFT HEART  2013    Negative   • REPEAT C SECTION  04/21/1988   • TUBAL LIGATION Bilateral 04/21/1988   • PRIMARY C SECTION  01/30/1987       Family History   Problem Relation Age of Onset   • Lung Disease Mother      COPD    • Arthritis Mother    • Psychiatry Mother      depression, suicide   • Cancer Father    • Other Father      hypoglycemia   • Heart Disease Maternal Aunt 64   • Cancer Maternal Aunt      breast cancer   • Heart Disease Maternal Grandmother      pacer       Social History     Social History   • Marital status:      Spouse name: N/A   • Number of children: N/A   • Years of education: N/A     Occupational History   • Not on file.     Social History Main Topics   • Smoking status: Never Smoker   • Smokeless tobacco: Never Used   • Alcohol use No   • Drug use: No   • Sexual activity: Not on file     Other Topics Concern   • Not on file     Social History Narrative   • No narrative on file       Allergies as of 07/23/2018 - Reviewed 07/23/2018   Allergen Reaction Noted   • Celebrex [celecoxib] Hives 10/16/2017   • Penicillins Hives 10/03/2017        @Vital signs for this encounter:  Vitals:    07/23/18 0941   Height: 1.6 m (5' 3\")   Weight: (!) 132.9 kg (293 lb)   Weight % change since last entry.: 0 %   BP: 126/72   Pulse: 66   BMI (Calculated): 51.9   Resp: 16   O2 sat % room air: 96 %       Current medications as of today   Current Outpatient Prescriptions   Medication Sig Dispense Refill   • carvedilol (COREG) 3.125 MG Tab TAKE 1 TAB BY MOUTH 2 TIMES A DAY, WITH MEALS. 180 Tab 0   • lisinopril (PRINIVIL) 5 MG Tab Take 1 Tab by mouth every day. 90 Tab 0   • triamterene/hctz (MAXZIDE-25/DYAZIDE) 37.5-25 MG Cap Take 1 Cap by mouth every morning. 90 Cap 3   • albuterol 108 (90 Base) MCG/ACT Aero Soln inhalation aerosol Inhale 2 Puffs by " mouth every 6 hours as needed for Shortness of Breath. 8.5 g 6     No current facility-administered medications for this visit.          Physical Exam:   Gen:           Alert and oriented, No apparent distress. Mood and affect appropriate, normal interaction with examiner.  Eyes:          PERRL, EOM intact, sclere white, conjunctive moist.  Ears:          Not examined.   Hearing:     Grossly intact.  Nose:          Normal, no lesions or deformities.  Dentition:    Good dentition.  Oropharynx:   Tongue normal, posterior pharynx without erythema or exudate.  Mallampati Classification: not examined.  Neck:        Supple, trachea midline, no masses.  Respiratory Effort: No intercostal retractions or use of accessory muscles.   Lung Auscultation:      Clear to auscultation bilaterally; no rales, rhonchi or wheezing.  CV:            Regular rate and rhythm. No murmurs, rubs or gallops.  Abd:           Not examined.   Lymphadenopathy: Not examined.  Gait and Station: Normal.  Digits and Nails: No clubbing, cyanosis, petechiae, or nodes.   Cranial Nerves: II-XII grossly intact.  Skin:        No rashes, lesions or ulcers noted.               Ext:           No cyanosis or edema.      Assessment:  1. Obstructive sleep apnea syndrome     2. Mild intermittent asthma without complication     3. Essential hypertension     4. Enlarged heart     5. Morbid obesity with BMI of 50.0-59.9, adult (HCC)  HEIGHT AND WEIGHT       Immunizations:    Flu:10/2017  Pneumovax 23:10/2017   Prevnar 13:due at age 66    Plan:  We will attempt obtaining her prior diagnostic sleep study.  We can then initiate mask and supplies through a local DME.  If we are unable to do this, will perform home sleep study and then go ahead and place orders.  She currently has enough supplies at home at the moment.  Her PCP was contacted and stated they would not release records in the chart from a different entity. They will call us back to verify.  1 continue CPAP 16  cm H2O nightly with 2 L bleed in.  2.  Encourage weight loss.  3.  Discussed sleep hygiene.  4.  Follow-up in 6 months, sooner if needed.

## 2018-07-23 NOTE — TELEPHONE ENCOUNTER
Please see media for provider info -  Patient needs her sleep study from her provider in [place]. Done about 5 yrs ago in order to set her up with local DME for supplies.  If we can't find this, must have home sleep study now to qualify.  Please let me know ASAP.

## 2018-07-25 NOTE — TELEPHONE ENCOUNTER
Called and spoke to Salome. Scheduled her for the soonest HST august 13th. I will remind her to pick it up.

## 2018-08-13 ENCOUNTER — APPOINTMENT (OUTPATIENT)
Dept: SLEEP MEDICINE | Facility: MEDICAL CENTER | Age: 53
End: 2018-08-13
Payer: COMMERCIAL

## 2018-10-05 DIAGNOSIS — I10 ESSENTIAL HYPERTENSION: ICD-10-CM

## 2018-10-05 RX ORDER — CARVEDILOL 3.12 MG/1
3.12 TABLET ORAL 2 TIMES DAILY WITH MEALS
Qty: 180 TAB | Refills: 3 | Status: SHIPPED | OUTPATIENT
Start: 2018-10-05 | End: 2018-10-15 | Stop reason: SDUPTHER

## 2018-10-15 ENCOUNTER — HOSPITAL ENCOUNTER (OUTPATIENT)
Dept: LAB | Facility: MEDICAL CENTER | Age: 53
End: 2018-10-15
Attending: NURSE PRACTITIONER
Payer: COMMERCIAL

## 2018-10-15 ENCOUNTER — OFFICE VISIT (OUTPATIENT)
Dept: MEDICAL GROUP | Facility: PHYSICIAN GROUP | Age: 53
End: 2018-10-15
Payer: COMMERCIAL

## 2018-10-15 VITALS
HEART RATE: 86 BPM | HEIGHT: 63 IN | SYSTOLIC BLOOD PRESSURE: 124 MMHG | WEIGHT: 289 LBS | DIASTOLIC BLOOD PRESSURE: 70 MMHG | TEMPERATURE: 97.9 F | BODY MASS INDEX: 51.21 KG/M2 | RESPIRATION RATE: 16 BRPM | OXYGEN SATURATION: 96 %

## 2018-10-15 DIAGNOSIS — Z00.00 GENERAL MEDICAL EXAM: ICD-10-CM

## 2018-10-15 DIAGNOSIS — J45.20 MILD INTERMITTENT ASTHMA WITHOUT COMPLICATION: ICD-10-CM

## 2018-10-15 DIAGNOSIS — Z23 NEED FOR VACCINATION: ICD-10-CM

## 2018-10-15 DIAGNOSIS — I10 ESSENTIAL HYPERTENSION: ICD-10-CM

## 2018-10-15 DIAGNOSIS — G47.33 OBSTRUCTIVE SLEEP APNEA SYNDROME: Chronic | ICD-10-CM

## 2018-10-15 DIAGNOSIS — F43.21 SITUATIONAL DEPRESSION: ICD-10-CM

## 2018-10-15 LAB
BASOPHILS # BLD AUTO: 0.6 % (ref 0–1.8)
BASOPHILS # BLD: 0.05 K/UL (ref 0–0.12)
EOSINOPHIL # BLD AUTO: 0.1 K/UL (ref 0–0.51)
EOSINOPHIL NFR BLD: 1.3 % (ref 0–6.9)
ERYTHROCYTE [DISTWIDTH] IN BLOOD BY AUTOMATED COUNT: 48.4 FL (ref 35.9–50)
HCT VFR BLD AUTO: 41.9 % (ref 37–47)
HGB BLD-MCNC: 13.4 G/DL (ref 12–16)
IMM GRANULOCYTES # BLD AUTO: 0.02 K/UL (ref 0–0.11)
IMM GRANULOCYTES NFR BLD AUTO: 0.3 % (ref 0–0.9)
LYMPHOCYTES # BLD AUTO: 1.61 K/UL (ref 1–4.8)
LYMPHOCYTES NFR BLD: 20.6 % (ref 22–41)
MCH RBC QN AUTO: 29.3 PG (ref 27–33)
MCHC RBC AUTO-ENTMCNC: 32 G/DL (ref 33.6–35)
MCV RBC AUTO: 91.7 FL (ref 81.4–97.8)
MONOCYTES # BLD AUTO: 0.49 K/UL (ref 0–0.85)
MONOCYTES NFR BLD AUTO: 6.3 % (ref 0–13.4)
NEUTROPHILS # BLD AUTO: 5.53 K/UL (ref 2–7.15)
NEUTROPHILS NFR BLD: 70.9 % (ref 44–72)
NRBC # BLD AUTO: 0 K/UL
NRBC BLD-RTO: 0 /100 WBC
PLATELET # BLD AUTO: 310 K/UL (ref 164–446)
PMV BLD AUTO: 11.5 FL (ref 9–12.9)
RBC # BLD AUTO: 4.57 M/UL (ref 4.2–5.4)
WBC # BLD AUTO: 7.8 K/UL (ref 4.8–10.8)

## 2018-10-15 PROCEDURE — 90686 IIV4 VACC NO PRSV 0.5 ML IM: CPT | Performed by: NURSE PRACTITIONER

## 2018-10-15 PROCEDURE — 80061 LIPID PANEL: CPT

## 2018-10-15 PROCEDURE — 90471 IMMUNIZATION ADMIN: CPT | Performed by: NURSE PRACTITIONER

## 2018-10-15 PROCEDURE — 85025 COMPLETE CBC W/AUTO DIFF WBC: CPT

## 2018-10-15 PROCEDURE — 99214 OFFICE O/P EST MOD 30 MIN: CPT | Mod: 25 | Performed by: NURSE PRACTITIONER

## 2018-10-15 PROCEDURE — 80053 COMPREHEN METABOLIC PANEL: CPT

## 2018-10-15 PROCEDURE — 36415 COLL VENOUS BLD VENIPUNCTURE: CPT

## 2018-10-15 RX ORDER — LISINOPRIL 5 MG/1
5 TABLET ORAL DAILY
Qty: 90 TAB | Refills: 0 | Status: SHIPPED | OUTPATIENT
Start: 2018-10-15 | End: 2019-01-07 | Stop reason: SDUPTHER

## 2018-10-15 RX ORDER — TRIAMTERENE AND HYDROCHLOROTHIAZIDE 37.5; 25 MG/1; MG/1
1 CAPSULE ORAL EVERY MORNING
Qty: 90 CAP | Refills: 3 | Status: SHIPPED | OUTPATIENT
Start: 2018-10-15 | End: 2019-04-15 | Stop reason: SDUPTHER

## 2018-10-15 RX ORDER — ALBUTEROL SULFATE 90 UG/1
2 AEROSOL, METERED RESPIRATORY (INHALATION) EVERY 6 HOURS PRN
Qty: 8.5 G | Refills: 6 | Status: SHIPPED | OUTPATIENT
Start: 2018-10-15 | End: 2020-02-18

## 2018-10-15 RX ORDER — CARVEDILOL 3.12 MG/1
3.12 TABLET ORAL 2 TIMES DAILY WITH MEALS
Qty: 180 TAB | Refills: 3 | Status: SHIPPED | OUTPATIENT
Start: 2018-10-15 | End: 2019-04-15 | Stop reason: SDUPTHER

## 2018-10-15 ASSESSMENT — PATIENT HEALTH QUESTIONNAIRE - PHQ9
CLINICAL INTERPRETATION OF PHQ2 SCORE: 2
5. POOR APPETITE OR OVEREATING: 1 - SEVERAL DAYS
SUM OF ALL RESPONSES TO PHQ QUESTIONS 1-9: 7

## 2018-10-15 NOTE — ASSESSMENT & PLAN NOTE
This is a new problem.  Patient states that over the last 2 weeks she has been feeling really down as her car is broken down.  Patient is slightly tearful in office counseled patient that if symptoms become worse or do not improve that she should follow-up patient denies suicidal or homicidal ideation.  Plan to monitor at this time.

## 2018-10-15 NOTE — PROGRESS NOTES
Chief Complaint   Patient presents with   • Blood Pressure Problem     no problem since medication    • Flu Vaccine       HISTORY OF PRESENT ILLNESS: Patient is a 53 y.o. female established patient who presents today to discuss hypertension.    Situational depression  This is a new problem.  Patient states that over the last 2 weeks she has been feeling really down as her car is broken down.  Patient is slightly tearful in office counseled patient that if symptoms become worse or do not improve that she should follow-up patient denies suicidal or homicidal ideation.  Plan to monitor at this time.    Obstructive sleep apnea syndrome  Chronic in nature.  Stable.  Patient continues to use CPAP with oxygen patient continues to follow-up with pulmonology.    Mild intermittent asthma without complication  Chronic in nature.  Stable.  Patient uses albuterol as needed.  Patient states that she uses it more frequently when it is colder before exercise.  She denies increased shortness of breath, nighttime wakening, cough.    Essential hypertension  Tonic in nature.  Stable.  Blood pressure today is 124/70.  Patient states that she is doing well denies chest pain, palpitations, dizziness, shortness of breath or blurry vision.  Denies side effects from medication.      Patient Active Problem List    Diagnosis Date Noted   • Situational depression 10/15/2018   • Excessive bleeding in premenopausal period 04/16/2018   • Endometriosis 04/16/2018   • Essential hypertension 10/16/2017   • Mild intermittent asthma without complication 10/16/2017   • Enlarged heart 10/16/2017   • Morbid obesity with BMI of 50.0-59.9, adult (HCC) 10/16/2017   • Iron deficiency anemia secondary to inadequate dietary iron intake 10/16/2017   • Murmur 10/16/2017   • Obstructive sleep apnea syndrome 10/16/2017       Allergies:Celebrex [celecoxib] and Penicillins    Current Outpatient Prescriptions   Medication Sig Dispense Refill   • carvedilol (COREG)  3.125 MG Tab Take 1 Tab by mouth 2 times a day, with meals. 180 Tab 3   • lisinopril (PRINIVIL) 5 MG Tab Take 1 Tab by mouth every day. 90 Tab 0   • triamterene/hctz (MAXZIDE-25/DYAZIDE) 37.5-25 MG Cap Take 1 Cap by mouth every morning. 90 Cap 3   • albuterol 108 (90 Base) MCG/ACT Aero Soln inhalation aerosol Inhale 2 Puffs by mouth every 6 hours as needed for Shortness of Breath. 8.5 g 6   • norethindrone (AYGESTIN) 5 MG tablet Take 5 mg by mouth every day.  3     No current facility-administered medications for this visit.        Social History   Substance Use Topics   • Smoking status: Never Smoker   • Smokeless tobacco: Never Used   • Alcohol use No       Family Status   Relation Status   • Mo    • Fa    • MAunt (Not Specified)   • MGMo (Not Specified)     Family History   Problem Relation Age of Onset   • Lung Disease Mother         COPD    • Arthritis Mother    • Psychiatry Mother         depression, suicide   • Cancer Father    • Other Father         hypoglycemia   • Heart Disease Maternal Aunt 64   • Cancer Maternal Aunt         breast cancer   • Heart Disease Maternal Grandmother         pacer       Review of Systems:   Constitutional:  Negative for fever, chills, weight loss and malaise/fatigue.   Respiratory:  Negative for cough, sputum production, shortness of breath and wheezing.    Cardiovascular:  Negative for chest pain, palpitations, orthopnea and leg swelling.   Gastrointestinal:  Negative for heartburn, nausea, vomiting and abdominal pain.   Genitourinary:  Negative for dysuria, urgency and frequency.   Musculoskeletal:  Negative for myalgias, back pain and joint pain.   Skin:  Negative for rash and itching.   Neurological:  Negative for dizziness, tingling, tremors, sensory change, focal weakness and headaches.   Endo/Heme/Allergies: Does not bruise/bleed easily.   Psychiatric/Behavioral: Positive for depression, suicidal ideas and memory loss.  The patient is not nervous/anxious  "and does not have insomnia.    All other systems reviewed and are negative except as in HPI.    Exam:  Blood pressure 124/70, pulse 86, temperature 36.6 °C (97.9 °F), temperature source Temporal, resp. rate 16, height 1.6 m (5' 3\"), weight (!) 131.1 kg (289 lb), SpO2 96 %, not currently breastfeeding.  General:  Normal appearing. No distress.  Pulmonary:  Clear to ausculation.  Normal effort. No rales, ronchi, or wheezing.  Cardiovascular:  Regular rate and rhythm without murmur. Carotid and radial pulses are intact and equal bilaterally.  Neurologic:  Grossly nonfocal  Skin:  Warm and dry.  No obvious lesions.  Musculoskeletal:  Normal gait. No extremity cyanosis, clubbing, or edema.  Psych:  Normal mood and affect. Alert and oriented x3. Judgment and insight is normal.      PLAN:    1. Need for vaccination  - Influenza Vaccine Quad Injection >3Y (PF)    2. Essential hypertension  Continue current medication, refill to new pharmacy.  - carvedilol (COREG) 3.125 MG Tab; Take 1 Tab by mouth 2 times a day, with meals.  Dispense: 180 Tab; Refill: 3  - lisinopril (PRINIVIL) 5 MG Tab; Take 1 Tab by mouth every day.  Dispense: 90 Tab; Refill: 0  - triamterene/hctz (MAXZIDE-25/DYAZIDE) 37.5-25 MG Cap; Take 1 Cap by mouth every morning.  Dispense: 90 Cap; Refill: 3    3. Mild intermittent asthma without complication  Continue current medication  - albuterol 108 (90 Base) MCG/ACT Aero Soln inhalation aerosol; Inhale 2 Puffs by mouth every 6 hours as needed for Shortness of Breath.  Dispense: 8.5 g; Refill: 6    4. Situational depression  We will monitor    5. General medical exam  - CBC WITH DIFFERENTIAL; Future  - COMP METABOLIC PANEL; Future  - LIPID PROFILE; Future    6. Obstructive sleep apnea syndrome  Continue follow-up with pulmonology    Follow-up 6 months or sooner as needed. Patient is encouraged to be seen in the emergency room for chest pain, palpitations, shortness of breath, dizziness, severe abdominal pain or " other concerning symptoms.      Please note that this dictation was created using voice recognition software. I have made every reasonable attempt to correct obvious errors, but I expect that there are errors of grammar and possibly content that I did not discover before finalizing the note.    Assessment/Plan:  1. Need for vaccination  Influenza Vaccine Quad Injection >3Y (PF)   2. Essential hypertension  carvedilol (COREG) 3.125 MG Tab    lisinopril (PRINIVIL) 5 MG Tab    triamterene/hctz (MAXZIDE-25/DYAZIDE) 37.5-25 MG Cap   3. Mild intermittent asthma without complication  albuterol 108 (90 Base) MCG/ACT Aero Soln inhalation aerosol   4. Situational depression     5. General medical exam  CBC WITH DIFFERENTIAL    COMP METABOLIC PANEL    LIPID PROFILE   6. Obstructive sleep apnea syndrome            I have placed the below orders and discussed them with an approved delegating provider. The MA is performing the below orders under the direction of Dr. Valenzuela.

## 2018-10-15 NOTE — ASSESSMENT & PLAN NOTE
Chronic in nature.  Stable.  Patient continues to use CPAP with oxygen patient continues to follow-up with pulmonology.

## 2018-10-15 NOTE — ASSESSMENT & PLAN NOTE
Tonic in nature.  Stable.  Blood pressure today is 124/70.  Patient states that she is doing well denies chest pain, palpitations, dizziness, shortness of breath or blurry vision.  Denies side effects from medication.

## 2018-10-15 NOTE — ASSESSMENT & PLAN NOTE
Chronic in nature.  Stable.  Patient uses albuterol as needed.  Patient states that she uses it more frequently when it is colder before exercise.  She denies increased shortness of breath, nighttime wakening, cough.

## 2018-10-16 LAB
ALBUMIN SERPL BCP-MCNC: 4 G/DL (ref 3.2–4.9)
ALBUMIN/GLOB SERPL: 1.1 G/DL
ALP SERPL-CCNC: 59 U/L (ref 30–99)
ALT SERPL-CCNC: 81 U/L (ref 2–50)
ANION GAP SERPL CALC-SCNC: 5 MMOL/L (ref 0–11.9)
AST SERPL-CCNC: 44 U/L (ref 12–45)
BILIRUB SERPL-MCNC: 0.5 MG/DL (ref 0.1–1.5)
BUN SERPL-MCNC: 13 MG/DL (ref 8–22)
CALCIUM SERPL-MCNC: 9.1 MG/DL (ref 8.5–10.5)
CHLORIDE SERPL-SCNC: 104 MMOL/L (ref 96–112)
CHOLEST SERPL-MCNC: 160 MG/DL (ref 100–199)
CO2 SERPL-SCNC: 27 MMOL/L (ref 20–33)
CREAT SERPL-MCNC: 0.64 MG/DL (ref 0.5–1.4)
FASTING STATUS PATIENT QL REPORTED: NORMAL
GLOBULIN SER CALC-MCNC: 3.8 G/DL (ref 1.9–3.5)
GLUCOSE SERPL-MCNC: 89 MG/DL (ref 65–99)
HDLC SERPL-MCNC: 34 MG/DL
LDLC SERPL CALC-MCNC: 111 MG/DL
POTASSIUM SERPL-SCNC: 4.2 MMOL/L (ref 3.6–5.5)
PROT SERPL-MCNC: 7.8 G/DL (ref 6–8.2)
SODIUM SERPL-SCNC: 136 MMOL/L (ref 135–145)
TRIGL SERPL-MCNC: 77 MG/DL (ref 0–149)

## 2018-10-17 ENCOUNTER — TELEPHONE (OUTPATIENT)
Dept: MEDICAL GROUP | Facility: PHYSICIAN GROUP | Age: 53
End: 2018-10-17

## 2018-10-17 DIAGNOSIS — R74.8 ELEVATED LIVER ENZYMES: ICD-10-CM

## 2018-10-17 NOTE — TELEPHONE ENCOUNTER
----- Message from SOLO Marr sent at 10/17/2018  9:47 AM PDT -----  Please call pt and give lab results: Labs are generally within normal limits your ALT, liver enzyme is mildly elevated at 81 and your globulin is slightly elevated at 3.8.  Elevated globulin can be related to inflammation or dehydration.  I am unsure why your ALT with elevated I would recommend repeating this lab in 3 months and following up if her enzymes continues to be elevated this can be related to diet.  Also total cholesterol is 160, triglycerides are 77 your HDL is low at 34 and your LDL is slightly higher at 111.  Please let me know if you have any questions.  I will order a follow-up CMP.

## 2018-12-22 ENCOUNTER — OFFICE VISIT (OUTPATIENT)
Dept: URGENT CARE | Facility: CLINIC | Age: 53
End: 2018-12-22
Payer: COMMERCIAL

## 2018-12-22 VITALS
OXYGEN SATURATION: 100 % | SYSTOLIC BLOOD PRESSURE: 118 MMHG | RESPIRATION RATE: 16 BRPM | WEIGHT: 290 LBS | BODY MASS INDEX: 51.38 KG/M2 | DIASTOLIC BLOOD PRESSURE: 74 MMHG | HEART RATE: 72 BPM | TEMPERATURE: 98.1 F | HEIGHT: 63 IN

## 2018-12-22 DIAGNOSIS — J06.9 VIRAL URI WITH COUGH: ICD-10-CM

## 2018-12-22 DIAGNOSIS — J45.20 MILD INTERMITTENT ASTHMA WITHOUT COMPLICATION: ICD-10-CM

## 2018-12-22 PROCEDURE — 99214 OFFICE O/P EST MOD 30 MIN: CPT | Performed by: NURSE PRACTITIONER

## 2018-12-22 RX ORDER — PREDNISONE 20 MG/1
TABLET ORAL
Qty: 10 TAB | Refills: 0 | Status: SHIPPED | OUTPATIENT
Start: 2018-12-22 | End: 2019-04-02

## 2018-12-22 ASSESSMENT — ENCOUNTER SYMPTOMS
FEVER: 0
ORTHOPNEA: 0
HEADACHES: 0
SORE THROAT: 0
NAUSEA: 0
CHILLS: 0
WHEEZING: 0
SHORTNESS OF BREATH: 0
DIARRHEA: 0
COUGH: 1
EYE DISCHARGE: 0
MYALGIAS: 0
SPUTUM PRODUCTION: 1

## 2018-12-22 NOTE — PROGRESS NOTES
Subjective:      Salome Garces is a 53 y.o. female who presents with Cough (x2 days, coughing, and tightness in chest)            HPI New problem. 53 year old female with cough and congestion since yesterday. She denies fever, chills, myalgia, sore throat or headache. She denies shortness of breath. Does admit to chest tightness (asthma). She has not taken any medication for this today. She has had her flu shot.  Celebrex [celecoxib] and Penicillins  Current Outpatient Prescriptions on File Prior to Visit   Medication Sig Dispense Refill   • norethindrone (AYGESTIN) 5 MG tablet Take 5 mg by mouth every day.  3   • carvedilol (COREG) 3.125 MG Tab Take 1 Tab by mouth 2 times a day, with meals. 180 Tab 3   • lisinopril (PRINIVIL) 5 MG Tab Take 1 Tab by mouth every day. 90 Tab 0   • triamterene/hctz (MAXZIDE-25/DYAZIDE) 37.5-25 MG Cap Take 1 Cap by mouth every morning. 90 Cap 3   • albuterol 108 (90 Base) MCG/ACT Aero Soln inhalation aerosol Inhale 2 Puffs by mouth every 6 hours as needed for Shortness of Breath. 8.5 g 6     No current facility-administered medications on file prior to visit.      Social History     Social History   • Marital status:      Spouse name: N/A   • Number of children: N/A   • Years of education: N/A     Occupational History   • Not on file.     Social History Main Topics   • Smoking status: Never Smoker   • Smokeless tobacco: Never Used   • Alcohol use No   • Drug use: No   • Sexual activity: Not on file     Other Topics Concern   • Not on file     Social History Narrative   • No narrative on file     family history includes Arthritis in her mother; Cancer in her father and maternal aunt; Heart Disease in her maternal grandmother; Heart Disease (age of onset: 64) in her maternal aunt; Lung Disease in her mother; Other in her father; Psychiatry in her mother.      Review of Systems   Constitutional: Positive for malaise/fatigue. Negative for chills and fever.   HENT: Positive for  "congestion. Negative for sore throat.    Eyes: Negative for discharge.   Respiratory: Positive for cough and sputum production. Negative for shortness of breath and wheezing.    Cardiovascular: Negative for chest pain and orthopnea.   Gastrointestinal: Negative for diarrhea and nausea.   Musculoskeletal: Negative for myalgias.   Neurological: Negative for headaches.   Endo/Heme/Allergies: Negative for environmental allergies.          Objective:     /74   Pulse 72   Temp 36.7 °C (98.1 °F)   Resp 16   Ht 1.6 m (5' 3\")   Wt (!) 131.5 kg (290 lb)   SpO2 100%   BMI 51.37 kg/m²      Physical Exam   Constitutional: She is oriented to person, place, and time. She appears well-developed and well-nourished. No distress.   HENT:   Head: Normocephalic and atraumatic.   Right Ear: External ear and ear canal normal. Tympanic membrane is not injected and not perforated. No middle ear effusion.   Left Ear: External ear and ear canal normal. Tympanic membrane is not injected and not perforated.  No middle ear effusion.   Nose: Mucosal edema present.   Mouth/Throat: Posterior oropharyngeal erythema present. No oropharyngeal exudate.   Eyes: Conjunctivae are normal. Right eye exhibits no discharge. Left eye exhibits no discharge.   Neck: Normal range of motion. Neck supple.   Cardiovascular: Normal rate, regular rhythm and normal heart sounds.    No murmur heard.  Pulmonary/Chest: Effort normal and breath sounds normal. No respiratory distress.   Musculoskeletal: Normal range of motion.   Normal movement of all 4 extremities.   Lymphadenopathy:     She has no cervical adenopathy.        Right: No supraclavicular adenopathy present.        Left: No supraclavicular adenopathy present.   Neurological: She is alert and oriented to person, place, and time. Gait normal.   Skin: Skin is warm and dry.   Psychiatric: She has a normal mood and affect. Her behavior is normal. Thought content normal.   Nursing note and vitals " reviewed.              Assessment/Plan:     1. Viral URI with cough  predniSONE (DELTASONE) 20 MG Tab   2. Mild intermittent asthma without complication       No wheezing on exam and sats at 100.  She is advised on prednisone rx.  Continue albuterol as needed.  May consider OTC cough and cold as this is viral at this time without indication for antibiotics.

## 2019-01-07 DIAGNOSIS — I10 ESSENTIAL HYPERTENSION: ICD-10-CM

## 2019-01-07 RX ORDER — LISINOPRIL 5 MG/1
5 TABLET ORAL DAILY
Qty: 90 TAB | Refills: 0 | Status: SHIPPED | OUTPATIENT
Start: 2019-01-07 | End: 2019-01-23 | Stop reason: SDUPTHER

## 2019-01-23 DIAGNOSIS — I10 ESSENTIAL HYPERTENSION: ICD-10-CM

## 2019-01-24 RX ORDER — LISINOPRIL 5 MG/1
TABLET ORAL
Qty: 90 TAB | Refills: 0 | Status: SHIPPED | OUTPATIENT
Start: 2019-01-24 | End: 2019-04-15 | Stop reason: SDUPTHER

## 2019-04-02 ENCOUNTER — SLEEP CENTER VISIT (OUTPATIENT)
Dept: SLEEP MEDICINE | Facility: MEDICAL CENTER | Age: 54
End: 2019-04-02
Payer: COMMERCIAL

## 2019-04-02 VITALS
OXYGEN SATURATION: 96 % | TEMPERATURE: 98.1 F | SYSTOLIC BLOOD PRESSURE: 120 MMHG | HEIGHT: 63 IN | HEART RATE: 66 BPM | WEIGHT: 293 LBS | RESPIRATION RATE: 16 BRPM | DIASTOLIC BLOOD PRESSURE: 90 MMHG | BODY MASS INDEX: 51.91 KG/M2

## 2019-04-02 DIAGNOSIS — I10 ESSENTIAL HYPERTENSION: ICD-10-CM

## 2019-04-02 DIAGNOSIS — Z78.9 NONSMOKER: ICD-10-CM

## 2019-04-02 DIAGNOSIS — E66.01 MORBID OBESITY WITH BMI OF 50.0-59.9, ADULT (HCC): ICD-10-CM

## 2019-04-02 DIAGNOSIS — I51.7 ENLARGED HEART: ICD-10-CM

## 2019-04-02 DIAGNOSIS — G47.33 OBSTRUCTIVE SLEEP APNEA SYNDROME: Chronic | ICD-10-CM

## 2019-04-02 PROCEDURE — 99214 OFFICE O/P EST MOD 30 MIN: CPT | Performed by: NURSE PRACTITIONER

## 2019-04-02 NOTE — PROGRESS NOTES
Chief Complaint   Patient presents with   • Apnea      CPAP 16 cm H2O nightly with 2 L bleed in       HPI:  Salome Garces is a 53 y.o. year old female here today for follow-up on JUNIOR. She was establishing care at last OV and needed updated sleep study testing to obtain supplies locally.  Novasom HST 2/2019 indicated severe sleep apnea with a 3 night study indicating an overall average of AHI 52.5 and a minimum oxygen saturation of less than 70%.  Patient has significant snoring.  Reviewed findings with patient.  She is currently using CPAP 16cm H2O nightly with 2 L oxygen bleed in.  She will continue to benefit from therapy.  Compliance card at last OV inducated 7hrs of avg use and reduced AHI of 5.2. She continues to use it nightly with great benefit. She denies morning headaches and notes consistent daytime energy. She is attempting to make dietary changes for weight loss. She notes dyspnea to be stable and mainly requires ALEXANDRO when going up stairs or carrying things. She lives on a second floor apartment. She denies cardiac symptoms.    She has a history of mild asthma, obesity and CHF. BMI 52.    ROS: As per HPI and otherwise negative if not stated.    Past Medical History:   Diagnosis Date   • Allergy    • Anemia    • Asthma 12/28/2017    Inhalers PRN   • Bronchitis 10/2017   • Congestive heart failure (HCC) 2013    Heart cath done-was in [place] at high elevation. NO further follow up and no cardiologist.   • Dental disorder 12/28/2017    Upper and lower dentures.   • DVT (deep venous thrombosis) (HCC)    • Enlarged heart    • Lithuan[place] measles    • Hypertension    • Obesity    • Sleep apnea     CPAP With O2 @ 2L/NC.   • Snoring        Past Surgical History:   Procedure Laterality Date   • COLONOSCOPY  1/8/2018    Procedure: COLONOSCOPY;  Surgeon: Dusty Gupta M.D.;  Location: SURGERY [facility];  Service: EUS   • COLONOSCOPY WITH BIOPSY  1/8/2018    Procedure: COLONOSCOPY WITH BIOPSY;  Surgeon: Dusty Gupta  "M.D.;  Location: SURGERY [facility];  Service: EUS   • CARDIAC CATH, RIGHT/LEFT HEART  2013    Negative   • REPEAT C SECTION  04/21/1988   • TUBAL LIGATION Bilateral 04/21/1988   • PRIMARY C SECTION  01/30/1987       Family History   Problem Relation Age of Onset   • Lung Disease Mother         COPD    • Arthritis Mother    • Psychiatry Mother         depression, suicide   • Cancer Father    • Other Father         hypoglycemia   • Heart Disease Maternal Aunt 64   • Cancer Maternal Aunt         breast cancer   • Heart Disease Maternal Grandmother         pacer       Social History     Social History   • Marital status:      Spouse name: N/A   • Number of children: N/A   • Years of education: N/A     Occupational History   • Not on file.     Social History Main Topics   • Smoking status: Never Smoker   • Smokeless tobacco: Never Used   • Alcohol use No   • Drug use: No   • Sexual activity: Not on file     Other Topics Concern   • Not on file     Social History Narrative   • No narrative on file       Allergies as of 04/02/2019 - Reviewed 04/02/2019   Allergen Reaction Noted   • Celebrex [celecoxib] Hives 04/29/2016   • Penicillins Hives 04/29/2016        @Vital signs for this encounter:  Vitals:    04/02/19 0817   Height: 1.6 m (5' 3\")   Weight: (!) 133.4 kg (294 lb)   Weight % change since last entry.: 0 %   BP: 120/90   Pulse: 66   BMI (Calculated): 52.08   Resp: 16   Temp: 36.7 °C (98.1 °F)   TempSrc: Temporal   O2 sat % room air: 96 %       Current medications as of today   Current Outpatient Prescriptions   Medication Sig Dispense Refill   • lisinopril (PRINIVIL) 5 MG Tab TAKE 1 TABLET BY MOUTH EVERY DAY 90 Tab 0   • carvedilol (COREG) 3.125 MG Tab Take 1 Tab by mouth 2 times a day, with meals. 180 Tab 3   • triamterene/hctz (MAXZIDE-25/DYAZIDE) 37.5-25 MG Cap Take 1 Cap by mouth every morning. 90 Cap 3   • norethindrone (AYGESTIN) 5 MG tablet Take 5 mg by mouth every day.  3   • albuterol 108 (90 " Base) MCG/ACT Aero Soln inhalation aerosol Inhale 2 Puffs by mouth every 6 hours as needed for Shortness of Breath. 8.5 g 6     No current facility-administered medications for this visit.          Physical Exam:   Gen:           Alert and oriented, No apparent distress. Mood and affect appropriate, normal interaction with examiner.  Eyes:          PERRL, EOM intact, sclere white, conjunctive moist.  Ears:          Not examined.   Hearing:     Grossly intact.  Nose:          Normal, no lesions or deformities.  Dentition:    Good dentition.  Oropharynx:   Tongue normal.  Mallampati Classification: not examined.  Neck:        Supple, trachea midline, no masses.  Respiratory Effort: No intercostal retractions or use of accessory muscles.   Lung Auscultation:      Clear to auscultation bilaterally; no rales, rhonchi or wheezing.  CV:            Regular rate and rhythm. No murmurs, rubs or gallops.  Abd:           Not examined.   Lymphadenopathy: Not examined.  Gait and Station: Normal.  Digits and Nails: No clubbing, cyanosis, petechiae, or nodes.   Cranial Nerves: II-XII grossly intact.  Skin:        No rashes, lesions or ulcers noted.               Ext:           No cyanosis or edema.      Assessment:  1. Obstructive sleep apnea syndrome     2. Essential hypertension     3. Morbid obesity with BMI of 50.0-59.9, adult (HCC)  Height And Weight   4. Enlarged heart     5. Nonsmoker         Immunizations:    Flu:10/2018  Pneumovax 23:due age 66  Prevnar 13:due age 66    Plan: JUNIOR is clinically stable.  1.  Continue CPAP of bleeding O2 nightly.  DME mask/supplies.  2.  Discussed sleep hygiene.  3.  Encourage weight loss through dietary changes and gentle exercise.  4.  Follow-up with primary care for other health concerns.  5.  Follow-up in 1 year with compliance card, sooner if needed.    Please note that this dictation was created using voice recognition software. I have made every reasonable attempt to correct obvious  errors, but it is possible there are errors of grammar and possibly content that I did not discover before finalizing the note.

## 2019-04-15 ENCOUNTER — OFFICE VISIT (OUTPATIENT)
Dept: MEDICAL GROUP | Facility: PHYSICIAN GROUP | Age: 54
End: 2019-04-15
Payer: COMMERCIAL

## 2019-04-15 VITALS
BODY MASS INDEX: 51.91 KG/M2 | RESPIRATION RATE: 14 BRPM | WEIGHT: 293 LBS | DIASTOLIC BLOOD PRESSURE: 70 MMHG | OXYGEN SATURATION: 96 % | HEIGHT: 63 IN | HEART RATE: 76 BPM | SYSTOLIC BLOOD PRESSURE: 116 MMHG | TEMPERATURE: 97.8 F

## 2019-04-15 DIAGNOSIS — Z23 NEED FOR VACCINATION: ICD-10-CM

## 2019-04-15 DIAGNOSIS — I10 ESSENTIAL HYPERTENSION: ICD-10-CM

## 2019-04-15 DIAGNOSIS — J45.20 MILD INTERMITTENT ASTHMA WITHOUT COMPLICATION: ICD-10-CM

## 2019-04-15 DIAGNOSIS — E66.01 MORBID OBESITY WITH BMI OF 50.0-59.9, ADULT (HCC): ICD-10-CM

## 2019-04-15 DIAGNOSIS — L30.9 ECZEMA, UNSPECIFIED TYPE: ICD-10-CM

## 2019-04-15 DIAGNOSIS — B07.0 PLANTAR WART: ICD-10-CM

## 2019-04-15 PROCEDURE — 90715 TDAP VACCINE 7 YRS/> IM: CPT | Performed by: NURSE PRACTITIONER

## 2019-04-15 PROCEDURE — 99214 OFFICE O/P EST MOD 30 MIN: CPT | Mod: 25 | Performed by: NURSE PRACTITIONER

## 2019-04-15 PROCEDURE — 90471 IMMUNIZATION ADMIN: CPT | Performed by: NURSE PRACTITIONER

## 2019-04-15 RX ORDER — LISINOPRIL 5 MG/1
5 TABLET ORAL
Qty: 90 TAB | Refills: 3 | Status: SHIPPED | OUTPATIENT
Start: 2019-04-15 | End: 2020-04-13

## 2019-04-15 RX ORDER — TRIAMCINOLONE ACETONIDE 1 MG/G
1 CREAM TOPICAL 2 TIMES DAILY
Qty: 1 TUBE | Refills: 0 | Status: SHIPPED | OUTPATIENT
Start: 2019-04-15 | End: 2019-08-14 | Stop reason: SDUPTHER

## 2019-04-15 RX ORDER — CARVEDILOL 3.12 MG/1
3.12 TABLET ORAL 2 TIMES DAILY WITH MEALS
Qty: 180 TAB | Refills: 3 | Status: SHIPPED | OUTPATIENT
Start: 2019-04-15 | End: 2020-07-07

## 2019-04-15 RX ORDER — TRIAMTERENE AND HYDROCHLOROTHIAZIDE 37.5; 25 MG/1; MG/1
1 CAPSULE ORAL EVERY MORNING
Qty: 90 CAP | Refills: 3 | Status: SHIPPED | OUTPATIENT
Start: 2019-04-15 | End: 2020-06-15

## 2019-04-15 ASSESSMENT — PATIENT HEALTH QUESTIONNAIRE - PHQ9: CLINICAL INTERPRETATION OF PHQ2 SCORE: 0

## 2019-04-15 NOTE — PROGRESS NOTES
Chief Complaint   Patient presents with   • Medication Refill       HISTORY OF THE PRESENT ILLNESS: This is a 53 y.o. female established patient who presents today for follow up of her chronic medical problems and for medication refill.    Essential hypertension  Chronic. Patient is currently on lisinopril, carvedilol, and triamterene/HCTZ which she has been compliant with taking. She requests for medication refill. Blood pressure in clinic today is within normal clinics. No reports of medication side effects or associated symptoms regarding hypertension. Negative for chest pain, palpitations, shortness of breath, dizziness, headaches, or vision changes.     Plantar wart  Eczema, unspecified type  Patient mentions having a wart on her left wrist. She also has dry itchy spots to the skin on her bilateral upper arms. She does not report any alleviating factors to these skin symptoms. She reports having history of moles which have had to be removed in the past. Denies any new moles or abnormal growths. States she would like to hold off on seeing dermatology due to costs.      Mild intermittent asthma without complication  Chronic. Patient uses albuterol as needed. No reports of active associated symptoms regarding asthma.     Patient is due for a mammogram. She also due for an updated tetanus vaccination.       Past Medical History:   Diagnosis Date   • Allergy    • Anemia    • Asthma 12/28/2017    Inhalers PRN   • Bronchitis 10/2017   • Congestive heart failure (HCC) 2013    Heart cath done-was in [place] at high elevation. NO further follow up and no cardiologist.   • Dental disorder 12/28/2017    Upper and lower dentures.   • DVT (deep venous thrombosis) (HCC)    • Enlarged heart    • [place] measles    • Hypertension    • Obesity    • Sleep apnea     CPAP With O2 @ 2L/NC.   • Snoring        Past Surgical History:   Procedure Laterality Date   • COLONOSCOPY  1/8/2018    Procedure: COLONOSCOPY;  Surgeon: Dusty Gupta  M.D.;  Location: SURGERY [facility];  Service: EUS   • COLONOSCOPY WITH BIOPSY  2018    Procedure: COLONOSCOPY WITH BIOPSY;  Surgeon: Dusty Gupta M.D.;  Location: SURGERY [facility];  Service: EUS   • CARDIAC CATH, RIGHT/LEFT HEART      Negative   • REPEAT C SECTION  1988   • TUBAL LIGATION Bilateral 1988   • PRIMARY C SECTION  1987       Family Status   Relation Status   • Mo    • Fa    • MAunt (Not Specified)   • MGMo (Not Specified)     Family History   Problem Relation Age of Onset   • Lung Disease Mother         COPD    • Arthritis Mother    • Psychiatry Mother         depression, suicide   • Cancer Father    • Other Father         hypoglycemia   • Heart Disease Maternal Aunt 64   • Cancer Maternal Aunt         breast cancer   • Heart Disease Maternal Grandmother         pacer       Social History   Substance Use Topics   • Smoking status: Never Smoker   • Smokeless tobacco: Never Used   • Alcohol use No       Allergies: Celebrex [celecoxib] and Penicillins    Current Outpatient Prescriptions Ordered in [facility]   Medication Sig Dispense Refill   • lisinopril (PRINIVIL) 5 MG Tab Take 1 Tab by mouth every day. 90 Tab 3   • carvedilol (COREG) 3.125 MG Tab Take 1 Tab by mouth 2 times a day, with meals. 180 Tab 3   • triamterene/hctz (MAXZIDE-25/DYAZIDE) 37.5-25 MG Cap Take 1 Cap by mouth every morning. 90 Cap 3   • triamcinolone acetonide (KENALOG) 0.1 % Cream Apply 1 Application to affected area(s) 2 times a day. 1 Tube 0   • norethindrone (AYGESTIN) 5 MG tablet Take 5 mg by mouth every day.  3   • albuterol 108 (90 Base) MCG/ACT Aero Soln inhalation aerosol Inhale 2 Puffs by mouth every 6 hours as needed for Shortness of Breath. 8.5 g 6     No current Epic-ordered facility-administered medications on file.        Review of Systems   Constitutional: Negative for fever, chills, weight loss and malaise/fatigue.   Respiratory: Negative for cough, sputum production,  "shortness of breath and wheezing.    Cardiovascular: Negative for chest pain, palpitations, orthopnea and leg swelling.   Skin: Wart on back of left wrist area. Dry and itchy areas of skin to bilateral upper arms.   Neurological: Negative for dizziness, tingling, tremors, sensory change, focal weakness and headaches.   All other systems reviewed and are negative except as in HPI.    Exam: /70   Pulse 76   Temp 36.6 °C (97.8 °F) (Temporal)   Resp 14   Ht 1.6 m (5' 3\")   Wt (!) 133.8 kg (295 lb)   SpO2 96%   General:  Normal appearing. No distress.  Pulmonary:  Clear to ausculation.  Normal effort. No rales, ronchi, or wheezing.  Cardiovascular:  Regular rate and rhythm without murmur. Carotid and radial pulses are intact and equal bilaterally.  Neurologic:  Grossly nonfocal  Skin:  Warm and dry.  Left upper arm with a small 0.5 cm round dry scaly area, right upper arm with a 2 cm scaling itchy spot that is red and slightly raised. Base of left wrist with a lesion that is less than size of a pencil.  Musculoskeletal:  Normal gait. No extremity cyanosis, clubbing, or edema.  Psych:  Normal mood and affect. Alert and oriented x3. Judgment and insight is normal.    CRYOTHERAPY:  Discussed risks and benefits of cryotherapy. Patient verbally agreed. 3 applications of cryotherapy were applied to plantar wart lesion on the base of the left wrist. Patient tolerated procedure well. Aftercare instructions given.    PLAN:    1. Need for vaccination  - Patient will get Tdap vaccination  - TDAP VACCINE =>6YO IM    2. Essential hypertension  - Well-controlled and stable. Blood pressure is within normal limits in clinic today. Continue current plan of care and medications. Refilling medications.  - Counseled patient on diet and exercise. Advised low sodium diet.   - lisinopril (PRINIVIL) 5 MG Tab; Take 1 Tab by mouth every day.  Dispense: 90 Tab; Refill: 3  - carvedilol (COREG) 3.125 MG Tab; Take 1 Tab by mouth 2 times a " day, with meals.  Dispense: 180 Tab; Refill: 3  - triamterene/hctz (MAXZIDE-25/DYAZIDE) 37.5-25 MG Cap; Take 1 Cap by mouth every morning.  Dispense: 90 Cap; Refill: 3    3. Mild intermittent asthma without complication  - Stable. Continue current plan of care and medications.     4. Morbid obesity with BMI of 50.0-59.9, adult (HCC)  - Patient identified as having a weight management issue. Appropriate orders and counseling given.     5. Plantar wart  - Performed cryotherapy procedure, as outlined above. Supportive care instructions and return precautions given.   - Patient reported having history of moles. Offered to refer her to dermatology for skin check but she declines at this time due to cost.    6. Eczema, unspecified type  - Prescribing Kenalog. Supportive care instructions and return precautions given.   - triamcinolone acetonide (KENALOG) 0.1 % Cream; Apply 1 Application to affected area(s) 2 times a day.  Dispense: 1 Tube; Refill: 0     Patient is due for mammogram ordered by GYN.    Patient is encouraged to be seen in the emergency room for chest pain, palpitations, shortness of breath, dizziness, severe abdominal pain or other concerning symptoms.     Please note that this dictation was created using voice recognition software. I have made every reasonable attempt to correct obvious errors, but I expect that there are errors of grammar and possibly content that I did not discover before finalizing the note.      Assessment/Plan  1. Need for vaccination  TDAP VACCINE =>8YO IM   2. Essential hypertension  lisinopril (PRINIVIL) 5 MG Tab    carvedilol (COREG) 3.125 MG Tab    triamterene/hctz (MAXZIDE-25/DYAZIDE) 37.5-25 MG Cap   3. Mild intermittent asthma without complication     4. Morbid obesity with BMI of 50.0-59.9, adult (HCC)     5. Plantar wart     6. Eczema, unspecified type  triamcinolone acetonide (KENALOG) 0.1 % Cream         I have placed the below orders and discussed them with an approved  delegating provider. The MA is performing the below orders under the direction of Dr. Forbes.       I, Sea Cueto (Scribe), am scribing for, and in the presence of, DIXIE Jackson    Electronically signed by: Sea Cueto (Miguelinaibe), 4/15/2019    Epifanio CINTRON APRN personally performed the services described in this documentation, as scribed by Sea Cueto in my presence, and it is both accurate and complete.

## 2019-05-13 ENCOUNTER — OFFICE VISIT (OUTPATIENT)
Dept: MEDICAL GROUP | Facility: PHYSICIAN GROUP | Age: 54
End: 2019-05-13
Payer: COMMERCIAL

## 2019-05-13 VITALS
BODY MASS INDEX: 51.91 KG/M2 | TEMPERATURE: 97.8 F | HEART RATE: 72 BPM | OXYGEN SATURATION: 93 % | DIASTOLIC BLOOD PRESSURE: 64 MMHG | HEIGHT: 63 IN | RESPIRATION RATE: 20 BRPM | WEIGHT: 293 LBS | SYSTOLIC BLOOD PRESSURE: 116 MMHG

## 2019-05-13 DIAGNOSIS — I10 ESSENTIAL HYPERTENSION: ICD-10-CM

## 2019-05-13 DIAGNOSIS — I51.7 ENLARGED HEART: ICD-10-CM

## 2019-05-13 DIAGNOSIS — I80.9 PHLEBITIS: ICD-10-CM

## 2019-05-13 DIAGNOSIS — J45.20 MILD INTERMITTENT ASTHMA WITHOUT COMPLICATION: ICD-10-CM

## 2019-05-13 DIAGNOSIS — D22.9 NUMEROUS SKIN MOLES: ICD-10-CM

## 2019-05-13 PROCEDURE — 99214 OFFICE O/P EST MOD 30 MIN: CPT | Performed by: NURSE PRACTITIONER

## 2019-05-13 NOTE — PROGRESS NOTES
Chief Complaint   Patient presents with   • Nevus     mole check   • Edema     bilat,from knees down x 6 days       HISTORY OF THE PRESENT ILLNESS: This is a 53 y.o. female established patient who presents today for follow up.    Essential hypertension  She takes Lisinopril 5 mg, carvedilol 3.125 mg, and triamterene/HCTZ 37.5-25 mg for her hypertension without any side effects. Blood pressure levels in the office are within goal.     Mild intermittent asthma without complication  Chronic issue. Well controlled on Albuterol rescue inhaler PRN.    Edema  Phlebitis  Patient notes that she had an episode of bilateral lower leg and feet edema 1 week ago. This has improved since last week. She adds that she stayed home yesterday and rested/elevated her legs. She notes associated feelings of pins and needles in her legs. She states this is a chronic issue related to her phlebitis. She typically was prescribed Naproxen and Gabapentin for this issue with good results. She has been taking OTC Naproxen for this flare-up. She also believes that her previous physician would place her on antibiotics for severe flare-ups as well. She adds that it has been about 6 to 7 years since she last had a flare-up.    Enlarged heart  Stable on diuretic medications. This could be related to her lower leg edema.     Moles  She would like to be checked for moles today, particularly for some on her back.           No problem-specific Assessment & Plan notes found for this encounter.      Past Medical History:   Diagnosis Date   • Allergy    • Anemia    • Asthma 12/28/2017    Inhalers PRN   • Bronchitis 10/2017   • Congestive heart failure (HCC) 2013    Heart cath done-was in [place] at high elevation. NO further follow up and no cardiologist.   • Dental disorder 12/28/2017    Upper and lower dentures.   • DVT (deep venous thrombosis) (HCC)    • Enlarged heart    • [place] measles    • Hypertension    • Obesity    • Sleep apnea     CPAP With O2 @  2L/NC.   • Snoring        Past Surgical History:   Procedure Laterality Date   • COLONOSCOPY  2018    Procedure: COLONOSCOPY;  Surgeon: Dusty Gupta M.D.;  Location: SURGERY [facility];  Service: EUS   • COLONOSCOPY WITH BIOPSY  2018    Procedure: COLONOSCOPY WITH BIOPSY;  Surgeon: Dusty Gupta M.D.;  Location: SURGERY [facility];  Service: EUS   • CARDIAC CATH, RIGHT/LEFT HEART  2013    Negative   • REPEAT C SECTION  1988   • TUBAL LIGATION Bilateral 1988   • PRIMARY C SECTION  1987       Family Status   Relation Status   • Mo    • Fa    • MAunt (Not Specified)   • MGMo (Not Specified)     Family History   Problem Relation Age of Onset   • Lung Disease Mother         COPD    • Arthritis Mother    • Psychiatry Mother         depression, suicide   • Cancer Father    • Other Father         hypoglycemia   • Heart Disease Maternal Aunt 64   • Cancer Maternal Aunt         breast cancer   • Heart Disease Maternal Grandmother         pacer       Social History   Substance Use Topics   • Smoking status: Never Smoker   • Smokeless tobacco: Never Used   • Alcohol use No       Allergies: Celebrex [celecoxib] and Penicillins    Current Outpatient Prescriptions Ordered in [facility]   Medication Sig Dispense Refill   • lisinopril (PRINIVIL) 5 MG Tab Take 1 Tab by mouth every day. 90 Tab 3   • carvedilol (COREG) 3.125 MG Tab Take 1 Tab by mouth 2 times a day, with meals. 180 Tab 3   • triamterene/hctz (MAXZIDE-25/DYAZIDE) 37.5-25 MG Cap Take 1 Cap by mouth every morning. 90 Cap 3   • triamcinolone acetonide (KENALOG) 0.1 % Cream Apply 1 Application to affected area(s) 2 times a day. 1 Tube 0   • norethindrone (AYGESTIN) 5 MG tablet Take 5 mg by mouth every day.  3   • albuterol 108 (90 Base) MCG/ACT Aero Soln inhalation aerosol Inhale 2 Puffs by mouth every 6 hours as needed for Shortness of Breath. 8.5 g 6     No current Epic-ordered facility-administered medications on file.   "      Review of Systems    Musculoskeletal: bilateral lower leg and feet edema. Negative for myalgias, back pain and joint pain.   Skin: Moles on back. Negative for rash and itching.  All other systems reviewed and are negative except as in HPI.    Exam: /64 (BP Location: Other (Comment), Patient Position: Sitting)   Pulse 72   Temp 36.6 °C (97.8 °F) (Temporal)   Resp 20   Ht 1.6 m (5' 3\")   Wt (!) 132.9 kg (293 lb)   SpO2 93%   General:  Normal appearing. No distress.  Pulmonary:  Clear to ausculation.  Normal effort. No rales, ronchi, or wheezing.  Cardiovascular:  Regular rate and rhythm without murmur. Carotid and radial pulses are intact and equal bilaterally.  Neurologic:  Grossly nonfocal  Skin:  Warm and dry. Multiple freckles on bilateral upper arms, chest, and upper back. Seborrheic keratoses in multiple places on back including a larger one on the left lumbar spine approximatley 2cm. Multiple skin tags. All moles appear with regular borders with uniform coloring but vary in shape or size. One small inflamed skin tag on right upper back at about her bra strap.  Musculoskeletal:  Normal gait. 1+ dependent edema. Mild shannon discoloration of bilateral lower extremities. No extremity cyanosis or clubbing.  Psych:  Normal mood and affect. Alert and oriented x3. Judgment and insight is normal.    PLAN:    1. Essential hypertension  Blood pressure is stable and within goal on current medications. We will continue the current dosages.    2. Mild intermittent asthma without complication  Well controlled on current medications. Continue current plan of treatment.    3. Enlarged heart  Stable. Continues to take diuretics. Possibly could be related to her lower leg edema.    4. Phlebitis  Improving. Continue conservative measures including elevation and OTC Naproxen. She will contact me if the issue does not resolve or worsens.    5. Numerous skin moles  Stable. Moles were not concerning upon physical " exam. Continue to follow. She will contact me if she sees any worsening.  See physical exam for more details.       Follow-up as needed or in 1 year to check moles. Patient is encouraged to be seen in the emergency room for chest pain, palpitations, shortness of breath, dizziness, severe abdominal pain or other concerning symptoms.      Please note that this dictation was created using voice recognition software. I have made every reasonable attempt to correct obvious errors, but I expect that there are errors of grammar and possibly content that I did not discover before finalizing the note.      Assessment/Plan  1. Essential hypertension     2. Mild intermittent asthma without complication     3. Enlarged heart     4. Phlebitis     5. Numerous skin moles              Billy CINTRON (Scribe), am scribing for, and in the presence of, DIXIE Jackson    Electronically signed by: Billy Bui (Scribe), 5/13/2019    Epifanio CINTRON APRN personally performed the services described in this documentation, as scribed by Billy Bui in my presence, and it is both accurate and complete.

## 2019-07-01 ENCOUNTER — APPOINTMENT (OUTPATIENT)
Dept: MEDICAL GROUP | Facility: PHYSICIAN GROUP | Age: 54
End: 2019-07-01
Payer: COMMERCIAL

## 2019-07-10 ENCOUNTER — OFFICE VISIT (OUTPATIENT)
Dept: MEDICAL GROUP | Facility: PHYSICIAN GROUP | Age: 54
End: 2019-07-10
Payer: COMMERCIAL

## 2019-07-10 VITALS
WEIGHT: 293 LBS | TEMPERATURE: 98.2 F | OXYGEN SATURATION: 94 % | BODY MASS INDEX: 51.91 KG/M2 | DIASTOLIC BLOOD PRESSURE: 74 MMHG | RESPIRATION RATE: 20 BRPM | SYSTOLIC BLOOD PRESSURE: 130 MMHG | HEART RATE: 98 BPM | HEIGHT: 63 IN

## 2019-07-10 DIAGNOSIS — F43.21 SITUATIONAL DEPRESSION: ICD-10-CM

## 2019-07-10 DIAGNOSIS — I10 ESSENTIAL HYPERTENSION: ICD-10-CM

## 2019-07-10 DIAGNOSIS — R60.0 BILATERAL LOWER EXTREMITY EDEMA: ICD-10-CM

## 2019-07-10 PROCEDURE — 99214 OFFICE O/P EST MOD 30 MIN: CPT | Performed by: NURSE PRACTITIONER

## 2019-07-10 NOTE — PROGRESS NOTES
Chief Complaint   Patient presents with   • Other     Accommodation        HISTORY OF THE PRESENT ILLNESS: This is a 53 y.o. female established patient who presents today to complete accomodation paperwork for work.    Patient is requesting to get accomodation paperwork completed that would allow her to take more bathroom breaks at her job. States her work gives her breaks that are 3 hours apart, but she cannot go 3 hours without going to the bathroom, as she is on diuretic medication for hypertension. She requests for 2 additional bathroom breaks. States it takes about 5 minutes for her to walk from her desk to the bathroom. She is also requesting to be able to use a stool to elevate her legs under her desk so that her legs do not get swollen. She is requesting for these accomodations to be permanent. Patient reports feeling sad from her newly born grandson passing away after he was born. She is coping well overall with this. She otherwise does not report any acute medical complaints at this time. No reports of any new associated symptoms regarding hypertension. No recent fevers.     Past Medical History:   Diagnosis Date   • Allergy    • Anemia    • Asthma 12/28/2017    Inhalers PRN   • Bronchitis 10/2017   • Congestive heart failure (HCC) 2013    Heart cath done-was in [place] at [place]. NO further follow up and no cardiologist.   • Dental disorder 12/28/2017    Upper and lower dentures.   • DVT (deep venous thrombosis) (HCC)    • Enlarged heart    • Tajik measles    • Hypertension    • Obesity    • Sleep apnea     CPAP With O2 @ 2L/NC.   • Snoring        Past Surgical History:   Procedure Laterality Date   • COLONOSCOPY  1/8/2018    Procedure: COLONOSCOPY;  Surgeon: Dusty Gupta M.D.;  Location: [facility];  Service: EUS   • COLONOSCOPY WITH BIOPSY  1/8/2018    Procedure: COLONOSCOPY WITH BIOPSY;  Surgeon: Dusty Gupta M.D.;  Location: [facility];  Service: EUS   • CARDIAC  CATH, RIGHT/LEFT HEART      Negative   • REPEAT C SECTION  1988   • TUBAL LIGATION Bilateral 1988   • PRIMARY C SECTION  1987       Family Status   Relation Status   • Mo    • Fa    • MAunt (Not Specified)   • MGMo (Not Specified)     Family History   Problem Relation Age of Onset   • Lung Disease Mother         COPD    • Arthritis Mother    • Psychiatry Mother         depression, suicide   • Cancer Father    • Other Father         hypoglycemia   • Heart Disease Maternal Aunt 64   • Cancer Maternal Aunt         breast cancer   • Heart Disease Maternal Grandmother         pacer       Social History   Substance Use Topics   • Smoking status: Never Smoker   • Smokeless tobacco: Never Used   • Alcohol use No       Allergies: Celebrex [celecoxib] and Penicillins    Current Outpatient Prescriptions Ordered in [facility]   Medication Sig Dispense Refill   • lisinopril (PRINIVIL) 5 MG Tab Take 1 Tab by mouth every day. 90 Tab 3   • carvedilol (COREG) 3.125 MG Tab Take 1 Tab by mouth 2 times a day, with meals. 180 Tab 3   • triamterene/hctz (MAXZIDE-25/DYAZIDE) 37.5-25 MG Cap Take 1 Cap by mouth every morning. 90 Cap 3   • triamcinolone acetonide (KENALOG) 0.1 % Cream Apply 1 Application to affected area(s) 2 times a day. 1 Tube 0   • norethindrone (AYGESTIN) 5 MG tablet Take 5 mg by mouth every day.  3   • albuterol 108 (90 Base) MCG/ACT Aero Soln inhalation aerosol Inhale 2 Puffs by mouth every 6 hours as needed for Shortness of Breath. 8.5 g 6     No current Epic-ordered facility-administered medications on file.        Review of Systems   Constitutional: Negative for fever, chills, weight loss and malaise/fatigue.   HENT: Negative for ear pain, nosebleeds, congestion, sore throat and neck pain.    Respiratory: Negative for cough, sputum production, shortness of breath and wheezing.    Skin: Negative for rash and itching.   Musculoskeletal: Chronic bilateral lower extremity  "swelling.  Endo/Heme/Allergies: Does not bruise/bleed easily.    Psychiatric: Sadness  All other systems reviewed and are negative except as in HPI.    Exam: /74 (BP Location: Left arm, Patient Position: Sitting, BP Cuff Size: Adult)   Pulse 98   Temp 36.8 °C (98.2 °F) (Temporal)   Resp 20   Ht 1.6 m (5' 3\")   Wt (!) 137.4 kg (303 lb)   SpO2 94%   General:  Normal appearing. No distress.  Pulmonary:  Clear to ausculation.  Normal effort. No rales, ronchi, or wheezing.  Cardiovascular:  Regular rate and rhythm without murmur. Carotid and radial pulses are intact and equal bilaterally.  Neurologic:  Grossly nonfocal  Skin:  Warm and dry.  No obvious lesions.  Musculoskeletal:  Normal gait. No extremity cyanosis or clubbing. Bilateral lower extremity edema.  Psych:  Normal mood and affect. Alert and oriented x3. Judgment and insight is normal.    PLAN:    1. Situational depression  - Patient is coping well with grief from loss of her grandson.    2. Essential hypertension  - Stable on current regimen. Continue current plan of care and medications. Completed accomodation paperwork for patient's job requesting that she can be given two additional bathroom breaks due to increased urination from her diuretic medication.    3. Bilateral lower extremity edema  - Stable overall. Completed accomodation paperwork for patient's job requesting that she can use a stool to elevate her legs under her desk to prevent/minimize leg swelling.     Follow-up in 3 months. Patient is encouraged to be seen in the emergency room for chest pain, palpitations, shortness of breath, dizziness, severe abdominal pain or other concerning symptoms.     Please note that this dictation was created using voice recognition software. I have made every reasonable attempt to correct obvious errors, but I expect that there are errors of grammar and possibly content that I did not discover before finalizing the note.      Assessment/Plan  1. " Situational depression     2. Essential hypertension     3. Bilateral lower extremity edema           I have placed the below orders and discussed them with an approved delegating provider. The MA is performing the below orders under the direction of Dr. Forbes.       ISea (Scribe), am scribing for, and in the presence of, DIXIE Jackson    Electronically signed by: Sea Cueto (Scribe), 7/10/2019    Epifanio CINTRON APRN personally performed the services described in this documentation, as scribed by Sea Cueto in my presence, and it is both accurate and complete.

## 2019-08-14 DIAGNOSIS — L30.9 ECZEMA, UNSPECIFIED TYPE: ICD-10-CM

## 2019-08-14 RX ORDER — TRIAMCINOLONE ACETONIDE 1 MG/G
1 CREAM TOPICAL 2 TIMES DAILY
Qty: 30 G | Refills: 0 | Status: SHIPPED | OUTPATIENT
Start: 2019-08-14 | End: 2019-12-17

## 2019-12-17 DIAGNOSIS — L30.9 ECZEMA, UNSPECIFIED TYPE: ICD-10-CM

## 2019-12-17 RX ORDER — TRIAMCINOLONE ACETONIDE 1 MG/G
1 CREAM TOPICAL 2 TIMES DAILY
Qty: 30 G | Refills: 0 | Status: SHIPPED | OUTPATIENT
Start: 2019-12-17 | End: 2021-10-18

## 2020-02-06 ENCOUNTER — APPOINTMENT (OUTPATIENT)
Dept: RADIOLOGY | Facility: IMAGING CENTER | Age: 55
End: 2020-02-06
Attending: FAMILY MEDICINE
Payer: COMMERCIAL

## 2020-02-06 ENCOUNTER — OFFICE VISIT (OUTPATIENT)
Dept: URGENT CARE | Facility: CLINIC | Age: 55
End: 2020-02-06
Payer: COMMERCIAL

## 2020-02-06 VITALS
WEIGHT: 293 LBS | HEART RATE: 88 BPM | BODY MASS INDEX: 51.91 KG/M2 | SYSTOLIC BLOOD PRESSURE: 148 MMHG | HEIGHT: 63 IN | OXYGEN SATURATION: 96 % | DIASTOLIC BLOOD PRESSURE: 78 MMHG | TEMPERATURE: 97.8 F

## 2020-02-06 DIAGNOSIS — M25.561 ACUTE PAIN OF RIGHT KNEE: ICD-10-CM

## 2020-02-06 DIAGNOSIS — M17.11 OSTEOARTHRITIS OF RIGHT KNEE, UNSPECIFIED OSTEOARTHRITIS TYPE: ICD-10-CM

## 2020-02-06 PROCEDURE — 99214 OFFICE O/P EST MOD 30 MIN: CPT | Performed by: FAMILY MEDICINE

## 2020-02-06 PROCEDURE — 73564 X-RAY EXAM KNEE 4 OR MORE: CPT | Mod: TC,RT | Performed by: FAMILY MEDICINE

## 2020-02-06 RX ORDER — TRAMADOL HYDROCHLORIDE 50 MG/1
50 TABLET ORAL EVERY 8 HOURS PRN
Qty: 15 TAB | Refills: 0 | Status: SHIPPED | OUTPATIENT
Start: 2020-02-06 | End: 2020-02-11

## 2020-02-07 NOTE — PROGRESS NOTES
"Subjective:      Salome Garces is a 54 y.o. female who presents with Knee Injury (right side starting last week) and Foot Pain (swollen and painful on right side)            5 or 6 days progressively worse right knee pain.  Medial aspect.  Severe last night and keeping her awake.  No locking or giving way.  No trauma or clear trigger.  She has chronic dependent edema, right foot seems to be worse recently.  no new calf swelling.  No risk factors for DVT.  No locking or giving way of the knee.  No relief with Aleve. Differential diagnosis, natural history, supportive care, and indications for immediate follow-up discussed at length.           Review of Systems   Constitutional: Negative for chills and fever.   Cardiovascular: Negative for orthopnea.   Musculoskeletal: Negative for back pain and neck pain.   Skin: Negative for itching and rash.   Neurological: Negative for sensory change and focal weakness.   .  Medications, Allergies, and current problem list reviewed today in Epic         Objective:     /78   Pulse 88   Temp 36.6 °C (97.8 °F)   Ht 1.6 m (5' 3\")   Wt (!) 137.4 kg (303 lb)   SpO2 96%   BMI 53.67 kg/m²      Physical Exam  Constitutional:       General: She is not in acute distress.     Appearance: She is well-developed.   HENT:      Head: Normocephalic and atraumatic.   Eyes:      Conjunctiva/sclera: Conjunctivae normal.   Pulmonary:      Effort: Pulmonary effort is normal.      Breath sounds: Normal breath sounds.   Musculoskeletal:      Comments: Right knee: Tender medial aspect.  No obvious effusion.  Range of motion is intact.  Stable with limited exam due to pain.    Bilateral pretibial and ankle edema.  Right calf diameter is equal to left calf diameter.  No cords.   Skin:     General: Skin is warm and dry.      Findings: No rash.   Neurological:      Mental Status: She is alert and oriented to person, place, and time.                 Assessment/Plan:     X-ray per radiology:  Mild " tricompartmental osteoarthritis of the knee.     No acute abnormality.    1. Acute pain of right knee  DX-KNEE COMPLETE 4+ RIGHT    tramadol (ULTRAM) 50 MG Tab   2. Osteoarthritis of right knee, unspecified osteoarthritis type  REFERRAL TO ORTHOPEDICS     Differential diagnosis, natural history, supportive care, and indications for immediate follow-up discussed at length.     Wells criteria -2 for DVT.  Recommended to ER with significant calf swelling.    Ace wrap as needed.  Transfer of care to Ortho for further evaluation and treatment.

## 2020-02-10 ASSESSMENT — ENCOUNTER SYMPTOMS
CHILLS: 0
NECK PAIN: 0
ORTHOPNEA: 0
BACK PAIN: 0
SENSORY CHANGE: 0
FOCAL WEAKNESS: 0
FEVER: 0

## 2020-02-18 DIAGNOSIS — J45.20 MILD INTERMITTENT ASTHMA WITHOUT COMPLICATION: ICD-10-CM

## 2020-03-03 ENCOUNTER — TELEPHONE (OUTPATIENT)
Dept: MEDICAL GROUP | Facility: PHYSICIAN GROUP | Age: 55
End: 2020-03-03

## 2020-03-04 ENCOUNTER — OFFICE VISIT (OUTPATIENT)
Dept: MEDICAL GROUP | Facility: PHYSICIAN GROUP | Age: 55
End: 2020-03-04
Payer: COMMERCIAL

## 2020-03-04 VITALS
BODY MASS INDEX: 51.91 KG/M2 | HEIGHT: 63 IN | RESPIRATION RATE: 20 BRPM | SYSTOLIC BLOOD PRESSURE: 102 MMHG | HEART RATE: 68 BPM | WEIGHT: 293 LBS | DIASTOLIC BLOOD PRESSURE: 62 MMHG | TEMPERATURE: 98.4 F | OXYGEN SATURATION: 96 %

## 2020-03-04 DIAGNOSIS — I80.9 PHLEBITIS: ICD-10-CM

## 2020-03-04 DIAGNOSIS — Z23 NEED FOR VACCINATION: ICD-10-CM

## 2020-03-04 PROCEDURE — 99214 OFFICE O/P EST MOD 30 MIN: CPT | Mod: 25 | Performed by: NURSE PRACTITIONER

## 2020-03-04 PROCEDURE — 90471 IMMUNIZATION ADMIN: CPT | Performed by: NURSE PRACTITIONER

## 2020-03-04 PROCEDURE — 90686 IIV4 VACC NO PRSV 0.5 ML IM: CPT | Performed by: NURSE PRACTITIONER

## 2020-03-04 RX ORDER — IBUPROFEN 800 MG/1
800 TABLET ORAL EVERY 8 HOURS PRN
Qty: 30 TAB | Refills: 1 | Status: SHIPPED | OUTPATIENT
Start: 2020-03-04 | End: 2021-10-26 | Stop reason: SDUPTHER

## 2020-03-04 RX ORDER — CEPHALEXIN 500 MG/1
500 CAPSULE ORAL 4 TIMES DAILY
Qty: 20 CAP | Refills: 0 | Status: SHIPPED | OUTPATIENT
Start: 2020-03-04 | End: 2020-03-09

## 2020-03-04 ASSESSMENT — PATIENT HEALTH QUESTIONNAIRE - PHQ9: CLINICAL INTERPRETATION OF PHQ2 SCORE: 0

## 2020-03-04 ASSESSMENT — FIBROSIS 4 INDEX: FIB4 SCORE: 0.84

## 2020-03-04 NOTE — TELEPHONE ENCOUNTER
Phone Number Called: [phone] (home)       Call outcome: Left detailed message for patient. Informed to call back with any additional questions.    Message: informed an appt today available at 1:40 if she is able to make it to call.  I booked appt for Pt, if unable to make will need to cancel

## 2020-03-04 NOTE — PROGRESS NOTES
Chief Complaint   Patient presents with   • Illness     Phlebitis flare up        HISTORY OF THE PRESENT ILLNESS: This is a 54 y.o. female established patient who presents today for evaluation of a phlebitis flare up.    Phlebitis  Patient has history of phlebitis. States it has been about 6-7 years since she last had a flare up of the phlebitis. Patient states her doctor in [place] treated her with antibiotic medication and advised her to elevate her legs when she had a flare up in the past. Per chart review, patient was treated with Keflex at the time. States her doctor gave her motrin 800 mg to help with the pain back then. She states that her bilateral lower legs and feet have been puffy, hot to touch, and painful over the last few days. No reports of any alleviating factors to her current symptoms. She has been elevating her feet when she lays down on the bed.     Need for vaccination  Patient has not received the influenza vaccination this season yet and would like to get it today.       Past Medical History:   Diagnosis Date   • Allergy    • Anemia    • Asthma 12/28/2017    Inhalers PRN   • Bronchitis 10/2017   • Congestive heart failure (HCC) 2013    Heart cath done-was in [place] at [place] elevation. NO further follow up and no cardiologist.   • Dental disorder 12/28/2017    Upper and lower dentures.   • DVT (deep venous thrombosis) (HCC)    • Enlarged heart    • Gren[place] measles    • Hypertension    • Obesity    • Sleep apnea     CPAP With O2 @ 2L/NC.   • Snoring        Past Surgical History:   Procedure Laterality Date   • COLONOSCOPY  1/8/2018    Procedure: COLONOSCOPY;  Surgeon: Dusty Gupta M.D.;  Location: [facility];  Service: EUS   • COLONOSCOPY WITH BIOPSY  1/8/2018    Procedure: COLONOSCOPY WITH BIOPSY;  Surgeon: Dusty Gupta M.D.;  Location: [facility];  Service: EUS   • CARDIAC CATH, RIGHT/LEFT HEART  2013    Negative   • REPEAT C SECTION  04/21/1988   • TUBAL LIGATION  Bilateral 1988   • PRIMARY C SECTION  1987       Family Status   Relation Name Status   • Mo     • Fa     • MAunt  (Not Specified)   • MGMo  (Not Specified)     Family History   Problem Relation Age of Onset   • Lung Disease Mother         COPD    • Arthritis Mother    • Psychiatric Illness Mother         depression, suicide   • Cancer Father    • Other Father         hypoglycemia   • Heart Disease Maternal Aunt 64   • Cancer Maternal Aunt         breast cancer   • Heart Disease Maternal Grandmother         pacer       Social History     Tobacco Use   • Smoking status: Never Smoker   • Smokeless tobacco: Never Used   Substance Use Topics   • Alcohol use: No   • Drug use: No       Allergies: Celebrex [celecoxib] and Penicillins    Current Outpatient Medications Ordered in Epic   Medication Sig Dispense Refill   • cephALEXin (KEFLEX) 500 MG Cap Take 1 Cap by mouth 4 times a day for 5 days. 20 Cap 0   • ibuprofen (MOTRIN) 800 MG Tab Take 1 Tab by mouth every 8 hours as needed. 30 Tab 1   • PROAIR  (90 Base) MCG/ACT Aero Soln inhalation aerosol INHALE 2 PUFFS BY MOUTH EVERY 6 HOURS AS NEEDED FOR SHORTNESS OF BREATH 8.5 Inhaler 0   • triamcinolone acetonide (KENALOG) 0.1 % Cream APPLY 1 APPLICATION TO AFFECTED AREA(S) 2 TIMES A DAY. 30 g 0   • lisinopril (PRINIVIL) 5 MG Tab Take 1 Tab by mouth every day. 90 Tab 3   • carvedilol (COREG) 3.125 MG Tab Take 1 Tab by mouth 2 times a day, with meals. 180 Tab 3   • triamterene/hctz (MAXZIDE-25/DYAZIDE) 37.5-25 MG Cap Take 1 Cap by mouth every morning. 90 Cap 3   • norethindrone (AYGESTIN) 5 MG tablet Take 5 mg by mouth every day.  3     No current [facility]-ordered facility-administered medications on file.        Review of Systems   Constitutional: Negative for fever, chills, weight loss and malaise/fatigue.   HENT: Negative for ear pain, nosebleeds, congestion, sore throat and neck pain.    Respiratory: Negative for cough, sputum production,  "shortness of breath and wheezing.    Cardiovascular: Negative for chest pain, palpitations, orthopnea and leg swelling.    Musculoskeletal: Bilateral lower legs and feet with puffiness, heat, and pain. Negative for back pain.  Skin: Redness to bilateral lower legs and feet.  Neurological: Negative for dizziness, tingling, tremors, sensory change, focal weakness and headaches.     All other systems reviewed and are negative except as in HPI.    Exam: /62 (BP Location: Right arm, Patient Position: Sitting, BP Cuff Size: Large adult)   Pulse 68   Temp 36.9 °C (98.4 °F) (Temporal)   Resp 20   Ht 1.6 m (5' 3\")   Wt (!) 136.4 kg (300 lb 12.8 oz)   SpO2 96%   General:  Normal appearing. No distress.  Pulmonary:  Clear to ausculation.  Normal effort. No rales, ronchi, or wheezing.  Cardiovascular:  Regular rate and rhythm without murmur. Carotid and radial pulses are intact and equal bilaterally.  Neurologic:  Grossly nonfocal  Skin:  Warm and dry.  No obvious lesions. See Musculoskeletal section.  Musculoskeletal:  Normal gait. No extremity cyanosis or clubbing. 2+ edema bilateral lower extremities and feet. Redness and dry scaling of skin on bilateral lower extremities from ankle to mid calves, most noticeable on medial lower legs, no swelling to back of calves  Psych:  Normal mood and affect. Alert and oriented x3. Judgment and insight is normal.    PLAN:    1. Need for vaccination  - Patient received influenza vaccine today.  - Influenza Vaccine Quad Injection (PF)    2. Phlebitis  - Patient feels that her symptoms are consistent with phlebitis flare up. We reviewed the past records which showed that she was treated with keflex for phlebitis flare up in the past with good results. Prescription for keflex was provided. Supportive care instructions and return precautions given. Advised patient to walk around more and elevate her feet. Prescribed ibuprofen 800 mg to help with the pain. Advised that if her " symptoms are not rapidly improving with the antibiotic, she will get an ultrasound. Ultrasound was ordered. Advised her to get the ultrasound done if her symptoms are not better by early next week. She can cancel the ultrasound if she feels better at that point.  - cephALEXin (KEFLEX) 500 MG Cap; Take 1 Cap by mouth 4 times a day for 5 days.  Dispense: 20 Cap; Refill: 0  - ibuprofen (MOTRIN) 800 MG Tab; Take 1 Tab by mouth every 8 hours as needed.  Dispense: 30 Tab; Refill: 1  - US-EXTREMITY VENOUS LOWER BILAT; Future     Patient is encouraged to be seen in the emergency room for chest pain, palpitations, shortness of breath, dizziness, severe abdominal pain or other concerning symptoms.     Please note that this dictation was created using voice recognition software. I have made every reasonable attempt to correct obvious errors, but I expect that there are errors of grammar and possibly content that I did not discover before finalizing the note.      Assessment/Plan  1. Need for vaccination  Influenza Vaccine Quad Injection (PF)   2. Phlebitis  cephALEXin (KEFLEX) 500 MG Cap    ibuprofen (MOTRIN) 800 MG Tab    US-EXTREMITY VENOUS LOWER BILAT    CANCELED: US-EXTREMITY VENOUS LOWER BILAT       I have placed the below orders and discussed them with an approved delegating provider. The MA is performing the below orders under the direction of Dr. Forbes.       Sea CINTRON (Scribe), am scribing for, and in the presence of, DIXIE Jackson    Electronically signed by: Sea Cueto (Lo), 3/4/2020    Epifanio CINTRON APRN personally performed the services described in this documentation, as scribed by Sea Cueto in my presence, and it is both accurate and complete.

## 2020-03-04 NOTE — LETTER
March 4, 2020        Salome Garces  [address] [place] [address] [address]  [place] [address]        To whom it may concern:    Please excuse Salome from work today 3/4/2020 and tomorrow 3/5/2020 related to pain and lower extremity swelling.    If you have any questions or concerns, please don't hesitate to call.        Sincerely,        Epifanio Myers, MIGUEL ÁNGEL.P.R.N.    Electronically Signed

## 2020-03-04 NOTE — TELEPHONE ENCOUNTER
VOICEMAIL  1. Caller Name: Salome Garces                        Call Back Number: [phone] (home)       2. Message: Pt called to say she will take the appt for today at 1:40pm.

## 2020-03-04 NOTE — TELEPHONE ENCOUNTER
VOICEMAIL  1. Caller Name: Salome Garces                      Call Back Number: [phone] (home)       2. Message: Pt called twice to inform of  her Phlebitis acting up, and needs an ABX and Pain med.  Pt stated her feet and ankles are swollen up to her knees, and red in color.  She is keeping them elevated.  Tried going to UC but they were not understanding this is a recurring issue with her condition.    3. Patient approves office to leave a detailed voicemail/MyChart message: yes

## 2020-03-04 NOTE — TELEPHONE ENCOUNTER
Phone Number Called: [phone] (home)       Call outcome: Spoke to patient regarding message below.    Message: Informed Pt PCP is out of office today, but would give the message to them on Wednesday when back in office.

## 2020-04-12 DIAGNOSIS — I10 ESSENTIAL HYPERTENSION: ICD-10-CM

## 2020-04-13 RX ORDER — LISINOPRIL 5 MG/1
TABLET ORAL
Qty: 90 TAB | Refills: 0 | Status: SHIPPED | OUTPATIENT
Start: 2020-04-13 | End: 2020-07-08

## 2020-06-11 ENCOUNTER — OFFICE VISIT (OUTPATIENT)
Dept: MEDICAL GROUP | Facility: PHYSICIAN GROUP | Age: 55
End: 2020-06-11
Payer: COMMERCIAL

## 2020-06-11 VITALS
TEMPERATURE: 97.3 F | HEART RATE: 59 BPM | DIASTOLIC BLOOD PRESSURE: 76 MMHG | OXYGEN SATURATION: 96 % | HEIGHT: 63 IN | BODY MASS INDEX: 49.68 KG/M2 | SYSTOLIC BLOOD PRESSURE: 130 MMHG | RESPIRATION RATE: 16 BRPM | WEIGHT: 280.4 LBS

## 2020-06-11 DIAGNOSIS — Z01.818 PRE-OP TESTING: ICD-10-CM

## 2020-06-11 DIAGNOSIS — M25.561 ACUTE PAIN OF RIGHT KNEE: ICD-10-CM

## 2020-06-11 DIAGNOSIS — E66.01 MORBID OBESITY WITH BMI OF 45.0-49.9, ADULT (HCC): ICD-10-CM

## 2020-06-11 PROCEDURE — 93000 ELECTROCARDIOGRAM COMPLETE: CPT | Performed by: NURSE PRACTITIONER

## 2020-06-11 PROCEDURE — 99214 OFFICE O/P EST MOD 30 MIN: CPT | Performed by: NURSE PRACTITIONER

## 2020-06-11 ASSESSMENT — FIBROSIS 4 INDEX: FIB4 SCORE: 0.84

## 2020-06-11 NOTE — PROGRESS NOTES
REASON FOR VISIT: Pre-Op Consultation  Consultation Requested by: Dr. Levi Munoz  Procedure date and type: Right Total Knee Arthoplasty    History of condition for which surgery is planned: Initial pain over a year, progressed over time. States it became severe in December 2020. States she was seen in  in January and X-ray showed osteoarthritis of three compartments. States stairs cause severe pain. States that she is continuing to have pain and difficulty walking.     Current chronic conditions: has Essential hypertension; Mild intermittent asthma without complication; Enlarged heart; Morbid obesity with BMI of 50.0-59.9, adult ([facility]); Iron deficiency anemia secondary to inadequate dietary iron intake; Murmur; Obstructive sleep apnea syndrome; Excessive bleeding in premenopausal period; Endometriosis; Situational depression; Phlebitis; and Morbid obesity with BMI of 45.0-49.9, adult ([facility]) on their problem list.  Past medical history:  has a past medical history of Allergy, Anemia, Asthma (12/28/2017), Bronchitis (10/2017), Congestive heart failure ([facility]) (2013), Dental disorder (12/28/2017), DVT (deep venous thrombosis) ([facility]), Enlarged heart, Montenegrin measles, Hypertension, Obesity, Sleep apnea, and Snoring.. Negative for: CAD, SBE, CVA, TIA, DVT, PE, bleeding requiring transfusion, intubation.  Surgical and anesthetic history:  has a past surgical history that includes cardiac cath, right/left heart (2013); primary c section (01/30/1987); repeat c section (04/21/1988); tubal ligation (Bilateral, 04/21/1988); colonoscopy (1/8/2018); and colonoscopy with biopsy (1/8/2018). Prior surgery without complication, bleeding, reaction to anesthetic, prolonged recovery  Habits:   Social History     Tobacco Use   • Smoking status: Never Smoker   • Smokeless tobacco: Never Used   Substance Use Topics   • Alcohol use: No   • Drug use: No     Allergies: Celebrex [celecoxib] and Penicillins No known allergy to Anesthetic, or  "Latex.     Current medicines:   Current Outpatient Medications   Medication Sig Dispense Refill   • lisinopril (PRINIVIL) 5 MG Tab TAKE 1 TABLET BY MOUTH EVERY DAY 90 Tab 0   • albuterol 108 (90 Base) MCG/ACT Aero Soln inhalation aerosol INHALE 2 PUFFS BY MOUTH EVERY 6 HOURS AS NEEDED FOR SHORTNESS OF BREATH 8.5 Inhaler 3   • ibuprofen (MOTRIN) 800 MG Tab Take 1 Tab by mouth every 8 hours as needed. 30 Tab 1   • triamcinolone acetonide (KENALOG) 0.1 % Cream APPLY 1 APPLICATION TO AFFECTED AREA(S) 2 TIMES A DAY. 30 g 0   • carvedilol (COREG) 3.125 MG Tab Take 1 Tab by mouth 2 times a day, with meals. 180 Tab 3   • triamterene/hctz (MAXZIDE-25/DYAZIDE) 37.5-25 MG Cap Take 1 Cap by mouth every morning. 90 Cap 3   • norethindrone (AYGESTIN) 5 MG tablet Take 5 mg by mouth every day.  3     No current facility-administered medications for this visit.      Anticoagulant: ASA, NSAIDs    Herbals: Black Cohash, Echinacea, Garlic, Ginger, Ginkgo Biloba, Ginseng, Kava, Sunitha’s Wort,  Saw Palmetto, Valerian             ROS: negative for: CP, SOB, PULIDO, Orthopnea, wheezing, leg edema, polydipsia, polyuria, fevers, chills, sweats, cough, cold, congestion, abd pain, reflux, black or bloody stools, weight loss/gain.  Functional Status: ambulatory    PHYSICAL EXAMINATION:  VITAL SIGNS: /76 (BP Location: Right arm, Patient Position: Sitting, BP Cuff Size: Large adult)   Pulse (!) 59   Temp 36.3 °C (97.3 °F) (Temporal)   Resp 16   Ht 1.6 m (5' 3\")   Wt (!) 127.2 kg (280 lb 6.4 oz)   SpO2 96%  Body mass index is 49.67 kg/m².  HEENT: EOMI, PERRL. Oropharynx pink, moist. Normal airway. Neck supple, no cervical lymphadenopathy.  LUNGS: CTAB good excursion.   HEART: RRR stable murmur.  ABDOMEN: soft, nondistended, nontender normal BS. No HSM.  LOWER EXTREMITIES: warm and well perfused with no edema.    EKG Interpretation   Interpreted by me   Rhythm: normal sinus   Rate: normal   Conduction: normal   ST Segments: no acute " change   T Waves: no acute change   Q Waves: none   Clinical Impression: no acute changes and normal EKG    IMPRESSION:  Diagnoses of Morbid obesity with BMI of 45.0-49.9, adult (HCC), Acute pain of right knee, and Pre-op testing were pertinent to this visit.  1. Planned surgery:Right Knee Arthroplasty   2. High risk medical conditions: negative for Cardiac, Pulmonary, Bleeding, Poor healing, Thrombosis, Debility    PLAN:  1. Chronic medical conditions: Stable and controlled. Continue current medicines.   2. Avoid drugs that potentiate bleeding as advised by surgeon  3. Discontinue all herbal supplements 2 weeks prior to surgery.  4. Need for SBE prophylaxis: no  5. This patient is considered Low risk for cardiopulmonary complications for this planned surgery.    IRENE IS CLEARED FOR SURGERY.    Rogelio Index for assessing perioperative cardiovascular risk [Circulation 1999;100:1047]:   (one point each for * high-risk surgery [ intrathoracic, suprainguinal vascular, intraperitoneal ],* Hx ischemic heart dz, * Hx CHF, *Hx TIA or CVA, *IDDM, *Serum Cr >2.0)   Interpretation of risk: (Complication = MI, Pulm edema, v-fib or cardiac arrest, complete heart block)  Very Low: 0 points = 0.4 % complication. Low: 1 point = 0.9 %, Moderate: 2 points = 6.6 %, High: 3+ points = 11%.

## 2020-06-14 DIAGNOSIS — I10 ESSENTIAL HYPERTENSION: ICD-10-CM

## 2020-06-15 RX ORDER — TRIAMTERENE AND HYDROCHLOROTHIAZIDE 37.5; 25 MG/1; MG/1
CAPSULE ORAL
Qty: 90 CAP | Refills: 0 | Status: SHIPPED | OUTPATIENT
Start: 2020-06-15 | End: 2020-09-08

## 2020-06-16 ENCOUNTER — HOSPITAL ENCOUNTER (OUTPATIENT)
Facility: MEDICAL CENTER | Age: 55
End: 2020-06-16
Attending: ORTHOPAEDIC SURGERY | Admitting: ORTHOPAEDIC SURGERY
Payer: COMMERCIAL

## 2020-07-07 DIAGNOSIS — I10 ESSENTIAL HYPERTENSION: ICD-10-CM

## 2020-07-07 RX ORDER — CARVEDILOL 3.12 MG/1
TABLET ORAL
Qty: 180 TAB | Refills: 0 | Status: SHIPPED | OUTPATIENT
Start: 2020-07-07 | End: 2020-10-05

## 2020-07-08 DIAGNOSIS — I10 ESSENTIAL HYPERTENSION: ICD-10-CM

## 2020-07-08 RX ORDER — LISINOPRIL 5 MG/1
TABLET ORAL
Qty: 90 TAB | Refills: 3 | Status: SHIPPED | OUTPATIENT
Start: 2020-07-08 | End: 2021-06-29 | Stop reason: SDUPTHER

## 2020-07-13 VITALS — BODY MASS INDEX: 50.39 KG/M2 | HEIGHT: 63 IN | WEIGHT: 284.39 LBS

## 2020-07-13 DIAGNOSIS — Z01.812 PRE-OPERATIVE LABORATORY EXAMINATION: ICD-10-CM

## 2020-07-13 LAB
ANION GAP SERPL CALC-SCNC: 11 MMOL/L (ref 7–16)
BUN SERPL-MCNC: 12 MG/DL (ref 8–22)
CALCIUM SERPL-MCNC: 9.1 MG/DL (ref 8.4–10.2)
CHLORIDE SERPL-SCNC: 101 MMOL/L (ref 96–112)
CO2 SERPL-SCNC: 24 MMOL/L (ref 20–33)
CREAT SERPL-MCNC: 0.79 MG/DL (ref 0.5–1.4)
ERYTHROCYTE [DISTWIDTH] IN BLOOD BY AUTOMATED COUNT: 48.1 FL (ref 35.9–50)
GLUCOSE SERPL-MCNC: 107 MG/DL (ref 65–99)
HCT VFR BLD AUTO: 40.4 % (ref 37–47)
HGB BLD-MCNC: 12.8 G/DL (ref 12–16)
MCH RBC QN AUTO: 27 PG (ref 27–33)
MCHC RBC AUTO-ENTMCNC: 31.7 G/DL (ref 33.6–35)
MCV RBC AUTO: 85.2 FL (ref 81.4–97.8)
PLATELET # BLD AUTO: 287 K/UL (ref 164–446)
PMV BLD AUTO: 10.6 FL (ref 9–12.9)
POTASSIUM SERPL-SCNC: 4 MMOL/L (ref 3.6–5.5)
RBC # BLD AUTO: 4.74 M/UL (ref 4.2–5.4)
SODIUM SERPL-SCNC: 136 MMOL/L (ref 135–145)
WBC # BLD AUTO: 7.6 K/UL (ref 4.8–10.8)

## 2020-07-13 PROCEDURE — 85027 COMPLETE CBC AUTOMATED: CPT

## 2020-07-13 PROCEDURE — 36415 COLL VENOUS BLD VENIPUNCTURE: CPT

## 2020-07-13 PROCEDURE — 87640 STAPH A DNA AMP PROBE: CPT

## 2020-07-13 PROCEDURE — 87641 MR-STAPH DNA AMP PROBE: CPT

## 2020-07-13 PROCEDURE — 80048 BASIC METABOLIC PNL TOTAL CA: CPT

## 2020-07-13 ASSESSMENT — FIBROSIS 4 INDEX: FIB4 SCORE: 0.84

## 2020-07-13 NOTE — DISCHARGE PLANNING
DISCHARGE PLANNING NOTE - TOTAL JOINT     Procedure: Procedure(s):  ARTHROPLASTY, KNEE, TOTAL  Procedure Date: 7/20/2020  Insurance:  Payor: AETNA / Plan: AETNA / Product Type: Multiple /   Equipment currently available at home? front-wheel walker, raised toilet seat and shower chair  Steps into the home? 1  Steps within the home? 1 flight.  Toilet height? ADA  Type of shower? walk-in shower  Who will be with you during your recovery? Son and daughter-in-law.  Is Outpatient Physical Therapy set up after surgery? Yes  Did you take the Total Joint Class and where? Yes, at [facility] 4/22/20.   Planning on same day discharge? Yes.     This writer met with pt during her preadmission appointment. Pt states she has gotten all needed equipment from local [employer] stores in [facility]. Her son is driving up from [place] to assist her with her recovery. Home safety checklist reviewed and copy with pt. All questions answered and pt verbalizes understanding. Anticipated dc to home without barriers.

## 2020-07-13 NOTE — OR NURSING
" Reviewed \"Preparing for your procedure\" verbally and copy given to pt. Also given \"fasting\", Pain management\", \"Patient Safety\", \"Speak-up\" handouts. Stated no further questions for surgeon on consent and pointed to surgical site listed on consent. Dr. Figueredo notified of BMI 50.   "

## 2020-07-14 LAB
SCCMEC + MECA PNL NOSE NAA+PROBE: NEGATIVE
SCCMEC + MECA PNL NOSE NAA+PROBE: NEGATIVE

## 2020-07-14 NOTE — OR NURSING
Dr Figueredo's response to hx notification-   Patient meets two of three red flag criteria regarding primary total joint replacement, mainly obstructive sleep apnea and BMI of greater than 40. The primary care provider’s note makes no mention of attempted weight loss or other optimization maneuvers prior to this type of surgery.     Furthermore, there is a history of congestive heart failure and “Enlarged heart” which needs to be defined better prior to this elective procedure. The concerns obviously here are pulmonary hypertension causing right heart failure in a morbidly obese patient with sleep apnea and reactive lung disease.    Please have this patient reevaluated for the above prior to this the surgery or reschedule surgery as need be. Thank you.  Zafar Figuerdeo M.D.  [facility]  Called and faxed to Dr Munoz

## 2020-07-15 ENCOUNTER — TELEPHONE (OUTPATIENT)
Dept: MEDICAL GROUP | Facility: PHYSICIAN GROUP | Age: 55
End: 2020-07-15

## 2020-07-15 DIAGNOSIS — E66.01 MORBID OBESITY WITH BMI OF 50.0-59.9, ADULT (HCC): ICD-10-CM

## 2020-07-15 NOTE — TELEPHONE ENCOUNTER
Spoke with Dr. Cardoza, patient was mistakenly scheduled and cleared for knee surgery with current BMI >49, she was seen in his office today with weight of 278 and surgery was cancelled. We discussed referral to HIP / physician monitored weight loss program, goal is BMI <40 or weight <225. I discussed with patient and she is agreeable, referral placed.

## 2020-07-15 NOTE — TELEPHONE ENCOUNTER
Dr. Levi Munoz call in regards to pt, did not leave any details. He may be reached on his personal cell at [phone]

## 2020-09-08 DIAGNOSIS — I10 ESSENTIAL HYPERTENSION: ICD-10-CM

## 2020-09-08 RX ORDER — TRIAMTERENE AND HYDROCHLOROTHIAZIDE 37.5; 25 MG/1; MG/1
CAPSULE ORAL
Qty: 90 CAP | Refills: 0 | Status: SHIPPED | OUTPATIENT
Start: 2020-09-08 | End: 2021-01-11

## 2020-10-03 DIAGNOSIS — I10 ESSENTIAL HYPERTENSION: ICD-10-CM

## 2020-10-05 RX ORDER — CARVEDILOL 3.12 MG/1
TABLET ORAL
Qty: 180 TAB | Refills: 0 | Status: SHIPPED | OUTPATIENT
Start: 2020-10-05 | End: 2021-01-05

## 2021-01-05 DIAGNOSIS — I10 ESSENTIAL HYPERTENSION: ICD-10-CM

## 2021-01-07 RX ORDER — CARVEDILOL 3.12 MG/1
TABLET ORAL
Qty: 180 TAB | Refills: 0 | Status: SHIPPED | OUTPATIENT
Start: 2021-01-07 | End: 2021-04-02

## 2021-01-09 DIAGNOSIS — I10 ESSENTIAL HYPERTENSION: ICD-10-CM

## 2021-01-13 RX ORDER — TRIAMTERENE AND HYDROCHLOROTHIAZIDE 37.5; 25 MG/1; MG/1
CAPSULE ORAL
Qty: 90 CAP | Refills: 0 | Status: SHIPPED | OUTPATIENT
Start: 2021-01-13 | End: 2021-04-12

## 2021-04-02 DIAGNOSIS — I10 ESSENTIAL HYPERTENSION: ICD-10-CM

## 2021-04-05 RX ORDER — CARVEDILOL 3.12 MG/1
TABLET ORAL
Qty: 180 TABLET | Refills: 0 | Status: SHIPPED | OUTPATIENT
Start: 2021-04-05 | End: 2021-06-29

## 2021-04-12 DIAGNOSIS — I10 ESSENTIAL HYPERTENSION: ICD-10-CM

## 2021-04-12 RX ORDER — TRIAMTERENE AND HYDROCHLOROTHIAZIDE 37.5; 25 MG/1; MG/1
CAPSULE ORAL
Qty: 90 CAPSULE | Refills: 0 | Status: SHIPPED | OUTPATIENT
Start: 2021-04-12 | End: 2021-07-22

## 2021-05-18 DIAGNOSIS — L30.9 ECZEMA, UNSPECIFIED TYPE: ICD-10-CM

## 2021-05-19 RX ORDER — TRIAMCINOLONE ACETONIDE 1 MG/G
1 CREAM TOPICAL 2 TIMES DAILY
Qty: 30 G | Refills: 0 | OUTPATIENT
Start: 2021-05-19

## 2021-06-29 DIAGNOSIS — I10 ESSENTIAL HYPERTENSION: ICD-10-CM

## 2021-06-29 RX ORDER — LISINOPRIL 5 MG/1
5 TABLET ORAL
Qty: 90 TABLET | Refills: 0 | Status: SHIPPED | OUTPATIENT
Start: 2021-06-29 | End: 2021-10-18 | Stop reason: SDUPTHER

## 2021-06-29 RX ORDER — CARVEDILOL 3.12 MG/1
TABLET ORAL
Qty: 180 TABLET | Refills: 0 | Status: SHIPPED | OUTPATIENT
Start: 2021-06-29 | End: 2021-09-21 | Stop reason: SDUPTHER

## 2021-07-21 DIAGNOSIS — I10 ESSENTIAL HYPERTENSION: ICD-10-CM

## 2021-07-22 RX ORDER — TRIAMTERENE AND HYDROCHLOROTHIAZIDE 37.5; 25 MG/1; MG/1
CAPSULE ORAL
Qty: 90 CAPSULE | Refills: 0 | Status: SHIPPED | OUTPATIENT
Start: 2021-07-22 | End: 2021-10-18 | Stop reason: SDUPTHER

## 2021-09-21 DIAGNOSIS — I10 ESSENTIAL HYPERTENSION: ICD-10-CM

## 2021-09-21 RX ORDER — CARVEDILOL 3.12 MG/1
3.12 TABLET ORAL 2 TIMES DAILY WITH MEALS
Qty: 180 TABLET | Refills: 0 | Status: SHIPPED | OUTPATIENT
Start: 2021-09-21 | End: 2021-10-18 | Stop reason: SDUPTHER

## 2021-09-21 RX ORDER — CARVEDILOL 3.12 MG/1
TABLET ORAL
Qty: 180 TABLET | Refills: 0 | OUTPATIENT
Start: 2021-09-21

## 2021-09-21 NOTE — TELEPHONE ENCOUNTER
Phone Number Called: [phone] (home)     Call outcome: Spoke to patient regarding message below.    Message: Pt has an appt scheduled for 10/18/21, that is the soonest she could get in to see you.  Will you send in one month to get the Pt through until then?

## 2021-09-26 DIAGNOSIS — I10 ESSENTIAL HYPERTENSION: ICD-10-CM

## 2021-09-27 RX ORDER — LISINOPRIL 5 MG/1
5 TABLET ORAL
Qty: 90 TABLET | Refills: 0 | OUTPATIENT
Start: 2021-09-27

## 2021-10-18 ENCOUNTER — OFFICE VISIT (OUTPATIENT)
Dept: MEDICAL GROUP | Facility: PHYSICIAN GROUP | Age: 56
End: 2021-10-18
Payer: COMMERCIAL

## 2021-10-18 VITALS
SYSTOLIC BLOOD PRESSURE: 126 MMHG | BODY MASS INDEX: 51.81 KG/M2 | OXYGEN SATURATION: 95 % | HEART RATE: 60 BPM | RESPIRATION RATE: 16 BRPM | HEIGHT: 63 IN | TEMPERATURE: 98.4 F | DIASTOLIC BLOOD PRESSURE: 68 MMHG | WEIGHT: 292.4 LBS

## 2021-10-18 DIAGNOSIS — H60.313 ACUTE DIFFUSE OTITIS EXTERNA OF BOTH EARS: ICD-10-CM

## 2021-10-18 DIAGNOSIS — I10 ESSENTIAL HYPERTENSION: ICD-10-CM

## 2021-10-18 DIAGNOSIS — J45.20 MILD INTERMITTENT ASTHMA WITHOUT COMPLICATION: ICD-10-CM

## 2021-10-18 DIAGNOSIS — H92.11 DRAINAGE FROM RIGHT EAR: ICD-10-CM

## 2021-10-18 PROBLEM — N92.4 EXCESSIVE BLEEDING IN PREMENOPAUSAL PERIOD: Status: RESOLVED | Noted: 2018-04-16 | Resolved: 2021-10-18

## 2021-10-18 PROBLEM — I80.9 PHLEBITIS: Status: RESOLVED | Noted: 2019-05-13 | Resolved: 2021-10-18

## 2021-10-18 PROCEDURE — 99214 OFFICE O/P EST MOD 30 MIN: CPT | Performed by: NURSE PRACTITIONER

## 2021-10-18 RX ORDER — ALBUTEROL SULFATE 90 UG/1
2 AEROSOL, METERED RESPIRATORY (INHALATION) EVERY 6 HOURS PRN
Qty: 8.5 G | Refills: 3 | Status: SHIPPED | OUTPATIENT
Start: 2021-10-18 | End: 2022-01-27

## 2021-10-18 RX ORDER — CARVEDILOL 3.12 MG/1
3.12 TABLET ORAL 2 TIMES DAILY WITH MEALS
Qty: 180 TABLET | Refills: 3 | Status: SHIPPED | OUTPATIENT
Start: 2021-10-18 | End: 2022-11-18

## 2021-10-18 RX ORDER — LISINOPRIL 5 MG/1
5 TABLET ORAL
Qty: 90 TABLET | Refills: 3 | Status: SHIPPED | OUTPATIENT
Start: 2021-10-18 | End: 2022-11-18

## 2021-10-18 RX ORDER — TRIAMTERENE AND HYDROCHLOROTHIAZIDE 37.5; 25 MG/1; MG/1
1 CAPSULE ORAL EVERY MORNING
Qty: 90 CAPSULE | Refills: 3 | Status: SHIPPED | OUTPATIENT
Start: 2021-10-18 | End: 2022-07-11

## 2021-10-18 RX ORDER — CLOTRIMAZOLE 1 G/ML
5 SOLUTION TOPICAL 2 TIMES DAILY
Qty: 10 ML | Refills: 0 | Status: SHIPPED | OUTPATIENT
Start: 2021-10-18 | End: 2022-11-03

## 2021-10-18 ASSESSMENT — PATIENT HEALTH QUESTIONNAIRE - PHQ9: CLINICAL INTERPRETATION OF PHQ2 SCORE: 0

## 2021-10-18 NOTE — PROGRESS NOTES
"Subjective:     CC:   Chief Complaint   Patient presents with   • Otalgia   • Ear Drainage     in AM \"its all crusty and yuck\"   • Medication Refill     all       HPI:   Salome presents today with    Problem   Acute Diffuse Otitis Externa of Both Ears    4 days ago patient woke up with a crusty discharge coming from her right ear.  She also began to experience pain in the right ear since yesterday.  She has not been able to observe much of the discharge, and cannot characterize the crusting, but has not seen any bloody discharge coming from her ear.  She admits to muffled hearing with everything sounding like it is \"under the water\".  Initially she tried putting peroxide in her ears but this did not help.  Yesterday she performed ear candling and states a lot of wax came out of her right ear.  She has performed the ear candling before in the past.  She did take a bath 2 weeks ago but does not recall submerging her ears under the water. Otherwise no significant water exposure.  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 She does not use Q-tips.      Essential Hypertension    Chronic in nature.  Stable. Patient reports compliance with blood pressure medication and no side effects.  She admits to mild fatigue but relates this to long hours at work.  She denies vision changes, lightheadedness, dizziness, chest pressure, or palpitations.     Mild Intermittent Asthma Without Complication    Chronic in nature.  Stable.  Patient states she continues to use albuterol as needed, about once per day, for exercise and cold weather induced bronchospasms.  Patient denies any racing heartbeat, tremors, lightheadedness or other side effects from the albuterol.      Phlebitis (Resolved)   Excessive Bleeding in Premenopausal Period (Resolved)       Current Outpatient Medications Ordered in Epic   Medication Sig Dispense Refill   • clotrimazole (LOTRIMIN) 1 % Solution Apply 5 Drops topically 2 times a day. " "10 mL 0   • carvedilol (COREG) 3.125 MG Tab Take 1 Tablet by mouth 2 times a day with meals. 180 Tablet 0   • triamterene/hctz (MAXZIDE-25/DYAZIDE) 37.5-25 MG Cap TAKE 1 CAPSULE BY MOUTH EVERY DAY IN THE MORNING 90 capsule 0   • lisinopril (PRINIVIL) 5 MG Tab Take 1 tablet by mouth every day. 90 tablet 0   • albuterol 108 (90 Base) MCG/ACT Aero Soln inhalation aerosol INHALE 2 PUFFS BY MOUTH EVERY 6 HOURS AS NEEDED FOR SHORTNESS OF BREATH 8.5 Inhaler 3   • ibuprofen (MOTRIN) 800 MG Tab Take 1 Tab by mouth every 8 hours as needed. 30 Tab 1   • triamcinolone acetonide (KENALOG) 0.1 % Cream APPLY 1 APPLICATION TO AFFECTED AREA(S) 2 TIMES A DAY. (Patient not taking: Reported on 7/13/2020) 30 g 0     No current Epic-ordered facility-administered medications on file.       Health Maintenance: will complete at next visit    ROS:  Gen: no fevers/chills, no changes in weight  Eyes: no changes in vision  ENT: right Ear pain and drainage; muffled hearing  Pulm: Sob with exertion and cold weather  CV: no chest pain, no palpitations, no dizziness  GI: no nausea/vomiting, no diarrhea  MSk: no myalgias  Skin: no rash  Neuro: no headaches, no numbness/tingling  Heme/Lymph: no easy bruising      Objective:     Exam:  /68 (BP Location: Left arm, Patient Position: Sitting, BP Cuff Size: Large adult long)   Pulse 60   Temp 36.9 °C (98.4 °F) (Temporal)   Resp 16   Ht 1.588 m (5' 2.5\")   Wt (!) 133 kg (292 lb 6.4 oz)   SpO2 95%   BMI 52.63 kg/m²  Body mass index is 52.63 kg/m².    Gen: Alert and oriented, No apparent distress.  Ears:    Right ear: Mild edema of external ear. Tenderness to palpation. Large amounts of whitish-yellow fluffy wax in the ear canal that appears to have a fungal component.  The ear canal is irritated and inflamed. Left ear:  On exam of the left ear, there is fuzziness around the ear canal.     Neck: Neck is supple without lymphadenopathy.  Lungs: Normal effort, CTA bilaterally, no wheezes, rhonchi, " or rales  CV: Regular rate and rhythm. No murmurs, rubs, or gallops.  Ext: No clubbing, cyanosis, edema.      Assessment & Plan:     56 y.o. female with the following -     Problem List Items Addressed This Visit     Acute diffuse otitis externa of both ears     On exam of right ear the TM could not be visualized due to large amounts of whitish-yellow fluffy wax in the ear canal that appears to have a fungal component.  The ear canal is irritated and inflamed.  Attempted to remove some of the wax to assess for TM rupture however this induced significant pain for the patient so we did not proceed further.  On exam of the left ear, there is some fuzziness around the ear canal.  She will begin antifungal drops in both ears and follow up in one week to reassess. She will contact the office if symptoms continued to worsen.          Relevant Medications    clotrimazole (LOTRIMIN) 1 % Solution    Essential hypertension     Blood pressure in office today is good at 126/68, HR 60.  Continue Lisinopril, carvedilol, and triamterene/ HCTZ. Refills provided.         Mild intermittent asthma without complication     Refills provided.                 Return in about 1 week (around 10/25/2021).    Please note that this dictation was created using voice recognition software. I have made every reasonable attempt to correct obvious errors, but I expect that there are errors of grammar and possibly content that I did not discover before finalizing the note.

## 2021-10-19 NOTE — ASSESSMENT & PLAN NOTE
On exam of right ear the TM could not be visualized due to large amounts of whitish-yellow fluffy wax in the ear canal that appears to have a fungal component.  The ear canal is irritated and inflamed.  Attempted to remove some of the wax to assess for TM rupture however this induced significant pain for the patient so we did not proceed further.  On exam of the left ear, there is some fuzziness around the ear canal.  She will begin antifungal drops in both ears and follow up in one week to reassess. She will contact the office if symptoms continued to worsen.

## 2021-10-19 NOTE — ASSESSMENT & PLAN NOTE
Blood pressure in office today is good at 126/68, HR 60.  Continue Lisinopril, carvedilol, and triamterene/ HCTZ. Refills provided.

## 2021-10-25 ENCOUNTER — OFFICE VISIT (OUTPATIENT)
Dept: MEDICAL GROUP | Facility: PHYSICIAN GROUP | Age: 56
End: 2021-10-25

## 2021-10-25 ENCOUNTER — APPOINTMENT (OUTPATIENT)
Dept: MEDICAL GROUP | Facility: PHYSICIAN GROUP | Age: 56
End: 2021-10-25
Payer: COMMERCIAL

## 2021-10-25 VITALS
WEIGHT: 293 LBS | HEART RATE: 78 BPM | BODY MASS INDEX: 51.91 KG/M2 | SYSTOLIC BLOOD PRESSURE: 110 MMHG | TEMPERATURE: 99.3 F | HEIGHT: 63 IN | OXYGEN SATURATION: 94 % | DIASTOLIC BLOOD PRESSURE: 76 MMHG | RESPIRATION RATE: 20 BRPM

## 2021-10-25 DIAGNOSIS — I10 ESSENTIAL HYPERTENSION: ICD-10-CM

## 2021-10-25 DIAGNOSIS — H60.313 ACUTE DIFFUSE OTITIS EXTERNA OF BOTH EARS: ICD-10-CM

## 2021-10-25 DIAGNOSIS — E66.01 MORBID OBESITY WITH BMI OF 50.0-59.9, ADULT (HCC): ICD-10-CM

## 2021-10-25 DIAGNOSIS — I80.9 PHLEBITIS: ICD-10-CM

## 2021-10-25 PROCEDURE — 69210 REMOVE IMPACTED EAR WAX UNI: CPT | Performed by: NURSE PRACTITIONER

## 2021-10-25 PROCEDURE — 99213 OFFICE O/P EST LOW 20 MIN: CPT | Mod: 25 | Performed by: NURSE PRACTITIONER

## 2021-10-25 RX ORDER — IBUPROFEN 800 MG/1
800 TABLET ORAL EVERY 8 HOURS PRN
Qty: 30 TABLET | Refills: 1 | Status: SHIPPED | OUTPATIENT
Start: 2021-10-25

## 2021-10-26 NOTE — PROGRESS NOTES
"Subjective:     Chief Complaint   Patient presents with   • Otalgia     better          HPI:   Salome presents today with     Problem   Acute Diffuse Otitis Externa of Both Ears    Last week patient woke up with a crusty discharge coming from her right ear.  She also began to experience pain in the right ear, states that since using the clotrimazole otic the pain has significantly improved.  She states that there is still a swollen sensation.  States muffled hearing with everything sounding like it is \"under the water\" has improved. States she feels like ears are improved.      Essential Hypertension    Chronic in nature.  Stable blood pressure is 110/76 today. Patient reports compliance with blood pressure medication and no side effects.  She admits to mild fatigue but relates this to long hours at work.  She denies vision changes, lightheadedness, dizziness, chest pressure, or palpitations.         Current Outpatient Medications Ordered in Epic   Medication Sig Dispense Refill   • ibuprofen (MOTRIN) 800 MG Tab Take 1 Tablet by mouth every 8 hours as needed. 30 Tablet 1   • clotrimazole (LOTRIMIN) 1 % Solution Apply 5 Drops topically 2 times a day. 10 mL 0   • carvedilol (COREG) 3.125 MG Tab Take 1 Tablet by mouth 2 times a day with meals. 180 Tablet 3   • triamterene/hctz (MAXZIDE-25/DYAZIDE) 37.5-25 MG Cap Take 1 Capsule by mouth every morning. 90 Capsule 3   • lisinopril (PRINIVIL) 5 MG Tab Take 1 Tablet by mouth every day. 90 Tablet 3   • albuterol 108 (90 Base) MCG/ACT Aero Soln inhalation aerosol Inhale 2 Puffs every 6 hours as needed for Shortness of Breath. INHALE 2 PUFFS BY MOUTH EVERY 6 HOURS AS NEEDED FOR SHORTNESS OF BREATH. 8.5 g 3     No current [facility]-ordered facility-administered medications on file.         ROS:  Gen: no fevers/chills, no changes in weight  Eyes: no changes in vision  ENT: no sore throat, no hearing loss, no bloody nose  Pulm: no sob, no cough  CV: no chest pain, no palpitations  GI: " "no nausea/vomiting, no diarrhea  : no dysuria  MSk: no myalgias  Skin: no rash  Neuro: no headaches, no numbness/tingling  Heme/Lymph: no easy bruising      Objective:     Exam:  /76 (BP Location: Right arm, Patient Position: Sitting, BP Cuff Size: Large adult)   Pulse 78   Temp 37.4 °C (99.3 °F) (Temporal)   Resp 20   Ht 1.588 m (5' 2.5\")   Wt (!) 134 kg (294 lb 9.6 oz)   SpO2 94%   BMI 53.02 kg/m²  Body mass index is 53.02 kg/m².    Gen: Alert and oriented, No apparent distress.  ENT: Bilateral ear canals are red, swollen.  Wax is noted to be clear from left ear, wax in right ear is significant.  There is some black fuzzy substance consistent with mold.  Eardrums are intact bilaterally  Neck: Neck is supple without lymphadenopathy.  Lungs: Normal effort, CTA bilaterally, no wheezes, rhonchi, or rales  CV: Regular rate and rhythm. No murmurs, rubs, or gallops.  Ext: No clubbing, cyanosis, edema.    Procedure: Cerumen Removal  Risks and benefits of procedure discussed with patient.  Cerumen removed with curette and then lavage after eardrum is noted to be intact patient tolerated the procedure well  Pt educated about proper care of ear canal. Q-tip cleaning discouraged, use of debrox and warm water lavage discussed.  Post lavage curette performed by provider, Post procedure exam with red inflamed canal and normal TM.    Assessment & Plan:     56 y.o. female with the following -     Problem List Items Addressed This Visit     Acute diffuse otitis externa of both ears     -On assessment ears are significantly improved there is still redness and swelling in the canal left ear is noted to be clean and improved with medication  -right ear is cleaned today and eardrum is visualized to be intact.  -Continue medication for at least 1 more week.           Essential hypertension     -Complete updated labs         Relevant Orders    CBC WITH DIFFERENTIAL    Comp Metabolic Panel    Lipid Profile    Morbid obesity " with BMI of 50.0-59.9, adult (HCC)    Relevant Orders    Lipid Profile      Other Visit Diagnoses     Phlebitis        Relevant Medications    ibuprofen (MOTRIN) 800 MG Tab          Return in about 1 week (around 11/1/2021) for Ears/Flu Vaccine.    Please note that this dictation was created using voice recognition software. I have made every reasonable attempt to correct obvious errors, but I expect that there are errors of grammar and possibly content that I did not discover before finalizing the note.

## 2021-10-26 NOTE — ASSESSMENT & PLAN NOTE
-On assessment ears are significantly improved there is still redness and swelling in the canal left ear is noted to be clean and improved with medication  -right ear is cleaned today and eardrum is visualized to be intact.  -Continue medication for at least 1 more week.

## 2021-11-08 ENCOUNTER — OFFICE VISIT (OUTPATIENT)
Dept: MEDICAL GROUP | Facility: PHYSICIAN GROUP | Age: 56
End: 2021-11-08
Payer: COMMERCIAL

## 2021-11-08 VITALS
RESPIRATION RATE: 20 BRPM | DIASTOLIC BLOOD PRESSURE: 64 MMHG | OXYGEN SATURATION: 97 % | SYSTOLIC BLOOD PRESSURE: 124 MMHG | TEMPERATURE: 97.9 F | HEART RATE: 72 BPM | HEIGHT: 63 IN | WEIGHT: 292 LBS | BODY MASS INDEX: 51.74 KG/M2

## 2021-11-08 DIAGNOSIS — H60.313 ACUTE DIFFUSE OTITIS EXTERNA OF BOTH EARS: ICD-10-CM

## 2021-11-08 DIAGNOSIS — Z23 NEED FOR VACCINATION: ICD-10-CM

## 2021-11-08 PROCEDURE — 90686 IIV4 VACC NO PRSV 0.5 ML IM: CPT | Performed by: NURSE PRACTITIONER

## 2021-11-08 PROCEDURE — 90471 IMMUNIZATION ADMIN: CPT | Performed by: NURSE PRACTITIONER

## 2021-11-08 PROCEDURE — 99212 OFFICE O/P EST SF 10 MIN: CPT | Mod: 25 | Performed by: NURSE PRACTITIONER

## 2021-11-09 NOTE — PROGRESS NOTES
Subjective:     Chief Complaint   Patient presents with   • Follow-Up     Ear check    • Immunizations     Flu vaccine          HPI:   Salome presents today with     Problem   Acute Diffuse Otitis Externa of Both Ears    Salome is here tonight for recheck of ears as she continued to have redness, inflammation on her recheck and was still having pain.  There was some concern that the medication was not not alleviating her symptoms.  Last week patient woke up with a crusty discharge coming from her right ear.  She also began to experience pain in the right ear, states states that pain has resolved and that her hearing is back to normal.         Current Outpatient Medications Ordered in Epic   Medication Sig Dispense Refill   • ibuprofen (MOTRIN) 800 MG Tab Take 1 Tablet by mouth every 8 hours as needed. 30 Tablet 1   • carvedilol (COREG) 3.125 MG Tab Take 1 Tablet by mouth 2 times a day with meals. 180 Tablet 3   • triamterene/hctz (MAXZIDE-25/DYAZIDE) 37.5-25 MG Cap Take 1 Capsule by mouth every morning. 90 Capsule 3   • lisinopril (PRINIVIL) 5 MG Tab Take 1 Tablet by mouth every day. 90 Tablet 3   • albuterol 108 (90 Base) MCG/ACT Aero Soln inhalation aerosol Inhale 2 Puffs every 6 hours as needed for Shortness of Breath. INHALE 2 PUFFS BY MOUTH EVERY 6 HOURS AS NEEDED FOR SHORTNESS OF BREATH. 8.5 g 3   • clotrimazole (LOTRIMIN) 1 % Solution Apply 5 Drops topically 2 times a day. (Patient not taking: Reported on 11/8/2021) 10 mL 0     No current Epic-ordered facility-administered medications on file.       Health Maintenance:     ROS:  Gen: no fevers/chills, no changes in weight  Eyes: no changes in vision  ENT: no sore throat, no hearing loss, no bloody nose  Pulm: no sob, no cough  CV: no chest pain, no palpitations  GI: no nausea/vomiting, no diarrhea  : no dysuria  MSk: no myalgias  Skin: no rash  Neuro: no headaches, no numbness/tingling  Heme/Lymph: no easy bruising      Objective:     Exam:  /64 (BP  "Location: Right arm, Patient Position: Sitting, BP Cuff Size: Large adult)   Pulse 72   Temp 36.6 °C (97.9 °F) (Temporal)   Resp 20   Ht 1.588 m (5' 2.5\")   Wt (!) 132 kg (292 lb)   SpO2 97%   BMI 52.56 kg/m²  Body mass index is 52.56 kg/m².    Gen: Alert and oriented, No apparent distress.  ENT: Bilateral tympanic membranes are noted to be gray with positive light reflex, ear canals are within normal limits with no indication of redness or inflammation.  Neck: Neck is supple without lymphadenopathy.  Lungs: Normal effort, CTA bilaterally, no wheezes, rhonchi, or rales  CV: Regular rate and rhythm. No murmurs, rubs, or gallops.  Ext: No clubbing, cyanosis, edema.      Assessment & Plan:     56 y.o. female with the following -     Problem List Items Addressed This Visit     Acute diffuse otitis externa of both ears     -Assessment indicates indicates complete resolution of infection.  -Counseled regarding prevention for recurrent infection.  -Discussed follow-up as needed for cerumen impaction.           Other Visit Diagnoses     Need for vaccination        Relevant Orders    Influenza Vaccine Quad Injection (PF) (Completed)            Return if symptoms worsen or fail to improve.    Please note that this dictation was created using voice recognition software. I have made every reasonable attempt to correct obvious errors, but I expect that there are errors of grammar and possibly content that I did not discover before finalizing the note.        "

## 2021-11-09 NOTE — ASSESSMENT & PLAN NOTE
-Assessment indicates indicates complete resolution of infection.  -Counseled regarding prevention for recurrent infection.  -Discussed follow-up as needed for cerumen impaction.

## 2022-01-27 DIAGNOSIS — J45.20 MILD INTERMITTENT ASTHMA WITHOUT COMPLICATION: ICD-10-CM

## 2022-01-27 RX ORDER — ALBUTEROL SULFATE 90 UG/1
AEROSOL, METERED RESPIRATORY (INHALATION)
Qty: 8.5 EACH | Refills: 3 | Status: SHIPPED | OUTPATIENT
Start: 2022-01-27 | End: 2022-05-09

## 2022-05-07 DIAGNOSIS — J45.20 MILD INTERMITTENT ASTHMA WITHOUT COMPLICATION: ICD-10-CM

## 2022-05-09 RX ORDER — ALBUTEROL SULFATE 90 UG/1
AEROSOL, METERED RESPIRATORY (INHALATION)
Qty: 8.5 EACH | Refills: 3 | Status: SHIPPED | OUTPATIENT
Start: 2022-05-09 | End: 2022-09-02

## 2022-07-11 DIAGNOSIS — I10 ESSENTIAL HYPERTENSION: ICD-10-CM

## 2022-07-11 RX ORDER — TRIAMTERENE AND HYDROCHLOROTHIAZIDE 37.5; 25 MG/1; MG/1
1 TABLET ORAL DAILY
Qty: 90 TABLET | Refills: 0 | Status: SHIPPED | OUTPATIENT
Start: 2022-07-11 | End: 2022-10-17

## 2022-09-02 DIAGNOSIS — J45.20 MILD INTERMITTENT ASTHMA WITHOUT COMPLICATION: ICD-10-CM

## 2022-09-02 RX ORDER — ALBUTEROL SULFATE 90 UG/1
AEROSOL, METERED RESPIRATORY (INHALATION)
Qty: 8.5 EACH | Refills: 0 | Status: SHIPPED | OUTPATIENT
Start: 2022-09-02 | End: 2022-10-04

## 2022-10-04 DIAGNOSIS — J45.20 MILD INTERMITTENT ASTHMA WITHOUT COMPLICATION: ICD-10-CM

## 2022-10-04 RX ORDER — ALBUTEROL SULFATE 90 UG/1
AEROSOL, METERED RESPIRATORY (INHALATION)
Qty: 8.5 EACH | Refills: 0 | Status: SHIPPED | OUTPATIENT
Start: 2022-10-04 | End: 2022-11-01

## 2022-10-15 DIAGNOSIS — I10 ESSENTIAL HYPERTENSION: ICD-10-CM

## 2022-10-17 RX ORDER — TRIAMTERENE AND HYDROCHLOROTHIAZIDE 37.5; 25 MG/1; MG/1
1 TABLET ORAL DAILY
Qty: 90 TABLET | Refills: 0 | Status: SHIPPED | OUTPATIENT
Start: 2022-10-17 | End: 2023-02-23

## 2022-11-01 DIAGNOSIS — J45.20 MILD INTERMITTENT ASTHMA WITHOUT COMPLICATION: ICD-10-CM

## 2022-11-01 RX ORDER — ALBUTEROL SULFATE 90 UG/1
AEROSOL, METERED RESPIRATORY (INHALATION)
Qty: 8.5 EACH | Refills: 0 | Status: SHIPPED | OUTPATIENT
Start: 2022-11-01 | End: 2023-08-30 | Stop reason: SDUPTHER

## 2022-11-03 ENCOUNTER — HOSPITAL ENCOUNTER (OUTPATIENT)
Dept: LAB | Facility: MEDICAL CENTER | Age: 57
End: 2022-11-03
Attending: NURSE PRACTITIONER
Payer: COMMERCIAL

## 2022-11-03 ENCOUNTER — OFFICE VISIT (OUTPATIENT)
Dept: MEDICAL GROUP | Facility: PHYSICIAN GROUP | Age: 57
End: 2022-11-03
Payer: COMMERCIAL

## 2022-11-03 VITALS
WEIGHT: 259 LBS | OXYGEN SATURATION: 96 % | TEMPERATURE: 97 F | SYSTOLIC BLOOD PRESSURE: 130 MMHG | BODY MASS INDEX: 47.66 KG/M2 | HEART RATE: 53 BPM | HEIGHT: 62 IN | DIASTOLIC BLOOD PRESSURE: 66 MMHG

## 2022-11-03 DIAGNOSIS — Z12.11 SCREENING FOR COLON CANCER: ICD-10-CM

## 2022-11-03 DIAGNOSIS — E66.01 MORBID OBESITY WITH BMI OF 45.0-49.9, ADULT (HCC): ICD-10-CM

## 2022-11-03 DIAGNOSIS — Z00.00 WELLNESS EXAMINATION: ICD-10-CM

## 2022-11-03 DIAGNOSIS — Z23 NEED FOR VACCINATION: ICD-10-CM

## 2022-11-03 DIAGNOSIS — Z12.31 ENCOUNTER FOR SCREENING MAMMOGRAM FOR MALIGNANT NEOPLASM OF BREAST: ICD-10-CM

## 2022-11-03 LAB
ALBUMIN SERPL BCP-MCNC: 4.5 G/DL (ref 3.2–4.9)
ALBUMIN/GLOB SERPL: 1.3 G/DL
ALP SERPL-CCNC: 99 U/L (ref 30–99)
ALT SERPL-CCNC: 95 U/L (ref 2–50)
ANION GAP SERPL CALC-SCNC: 13 MMOL/L (ref 7–16)
AST SERPL-CCNC: 60 U/L (ref 12–45)
BILIRUB SERPL-MCNC: 0.6 MG/DL (ref 0.1–1.5)
BUN SERPL-MCNC: 19 MG/DL (ref 8–22)
CALCIUM SERPL-MCNC: 10 MG/DL (ref 8.5–10.5)
CHLORIDE SERPL-SCNC: 99 MMOL/L (ref 96–112)
CHOLEST SERPL-MCNC: 198 MG/DL (ref 100–199)
CO2 SERPL-SCNC: 27 MMOL/L (ref 20–33)
CREAT SERPL-MCNC: 0.65 MG/DL (ref 0.5–1.4)
FASTING STATUS PATIENT QL REPORTED: NORMAL
GFR SERPLBLD CREATININE-BSD FMLA CKD-EPI: 103 ML/MIN/1.73 M 2
GLOBULIN SER CALC-MCNC: 3.4 G/DL (ref 1.9–3.5)
GLUCOSE SERPL-MCNC: 94 MG/DL (ref 65–99)
HDLC SERPL-MCNC: 50 MG/DL
LDLC SERPL CALC-MCNC: 127 MG/DL
POTASSIUM SERPL-SCNC: 4.4 MMOL/L (ref 3.6–5.5)
PROT SERPL-MCNC: 7.9 G/DL (ref 6–8.2)
SODIUM SERPL-SCNC: 139 MMOL/L (ref 135–145)
TRIGL SERPL-MCNC: 105 MG/DL (ref 0–149)

## 2022-11-03 PROCEDURE — 99396 PREV VISIT EST AGE 40-64: CPT | Mod: 25 | Performed by: NURSE PRACTITIONER

## 2022-11-03 PROCEDURE — 90686 IIV4 VACC NO PRSV 0.5 ML IM: CPT | Performed by: NURSE PRACTITIONER

## 2022-11-03 PROCEDURE — 80061 LIPID PANEL: CPT

## 2022-11-03 PROCEDURE — 90471 IMMUNIZATION ADMIN: CPT | Performed by: NURSE PRACTITIONER

## 2022-11-03 PROCEDURE — 90472 IMMUNIZATION ADMIN EACH ADD: CPT | Performed by: NURSE PRACTITIONER

## 2022-11-03 PROCEDURE — 90677 PCV20 VACCINE IM: CPT | Performed by: NURSE PRACTITIONER

## 2022-11-03 PROCEDURE — 80053 COMPREHEN METABOLIC PANEL: CPT

## 2022-11-03 PROCEDURE — 36415 COLL VENOUS BLD VENIPUNCTURE: CPT

## 2022-11-03 ASSESSMENT — PATIENT HEALTH QUESTIONNAIRE - PHQ9: CLINICAL INTERPRETATION OF PHQ2 SCORE: 0

## 2022-11-03 NOTE — PROGRESS NOTES
Subjective:     CC:   Chief Complaint   Patient presents with    Annual Exam     - Paperwork to be filled out for work and we need to fax it when done         HPI:   Salome Garces is a 57 y.o. female who presents for annual exam. She is feeling well and denies any complaints.    Ob-Gyn/ History:    Patient has GYN provider: no  /Para:  3/3  Last Pap Smear: due next year. no history of abnormal pap smears.  Gyn Surgery:  yes.  No significant bloating/fluid retention, pelvic pain, or dyspareunia. No vaginal discharge  Post-menopausal bleeding: no  Urinary incontinence: no      Health Maintenance  Advanced directive: received   Osteoporosis Screen/ DEXA: discussed starting screen at 66   PT/vit D for falls prevention: not currently   Cholesterol Screening: Ordered today  Diabetes Screening: Ordered today   Diet: Patient states that she is doing [employer] and has adopted a much healthier diet.  Patient has had significant weight loss with this program has gone from 299 pounds to 258.  States that she is making big lifestyle changes and states that recently she tried some pieces of candy since it was Halloween and stated that they did not taste very good as such she is motivated to continue on this path.  Her goal is to below 240 so that she can have her knee surgery.  States that the weight loss has significantly helped with her asthma, also with her joint pain including her knees.    Exercise: Patient is walking twice daily and states that she has been much more able to complete active workouts related to her weight loss.    Substance Abuse: no concern   Safe in relationship.   Seat belts, bike helmet, gun safety discussed.  Sun protection used.    Cancer screening  Colorectal Cancer Screening: referral placed.    Lung Cancer Screening: non-smoker    Cervical Cancer Screening: due    Breast Cancer Screening: ordered     Infectious disease screening/Immunizations  --STI Screening: declines   --Practices  safe sex.  --HIV Screening: done in past   --Hepatitis C Screening: ordered   --Immunizations: declines vaccine     She  has a past medical history of Allergy, Anemia, Arthritis, Asthma (12/28/2017), Bronchitis (10/2017), Congestive heart failure (HCC) (2013), Dental disorder (12/28/2017), Dependence on nocturnal oxygen therapy (07/2020), Enlarged heart, Excessive bleeding in premenopausal period (4/16/2018), Serbian measles, Hypertension, Obesity, Pain (07/2020), Sleep apnea, and Snoring.  She  has a past surgical history that includes cardiac cath, right/left heart (2013); primary c section (01/30/1987); repeat c section (04/21/1988); tubal ligation (Bilateral, 04/21/1988); colonoscopy (1/8/2018); and colonoscopy with biopsy (1/8/2018).    Family History   Problem Relation Age of Onset    Lung Disease Mother         COPD     Arthritis Mother     Psychiatric Illness Mother         depression, suicide    Cancer Father     Other Father         hypoglycemia    Heart Disease Maternal Aunt 64    Cancer Maternal Aunt         breast cancer    Heart Disease Maternal Grandmother         pacer       Social History     Socioeconomic History    Marital status:      Spouse name: Not on file    Number of children: Not on file    Years of education: Not on file    Highest education level: Not on file   Occupational History    Not on file   Tobacco Use    Smoking status: Never    Smokeless tobacco: Never   Vaping Use    Vaping Use: Never used   Substance and Sexual Activity    Alcohol use: No    Drug use: Never    Sexual activity: Not Currently   Other Topics Concern    Not on file   Social History Narrative    Not on file     Social Determinants of Health     Financial Resource Strain: Not on file   Food Insecurity: Not on file   Transportation Needs: Not on file   Physical Activity: Not on file   Stress: Not on file   Social Connections: Not on file   Intimate Partner Violence: Not on file   Housing Stability: Not on file        Patient Active Problem List    Diagnosis Date Noted    Acute diffuse otitis externa of both ears 10/18/2021    Morbid obesity with BMI of 45.0-49.9, adult ([facility]) 06/11/2020    Situational depression 10/15/2018    Endometriosis 04/16/2018    Essential hypertension 10/16/2017    Mild intermittent asthma without complication 10/16/2017    Enlarged heart 10/16/2017    Iron deficiency anemia secondary to inadequate dietary iron intake 10/16/2017    Murmur 10/16/2017    Obstructive sleep apnea syndrome 10/16/2017         Current Outpatient Medications   Medication Sig Dispense Refill    Glucosamine-Chondroitin (MOVE FREE PO) Take  by mouth every day.      Acetaminophen (TYLENOL ARTHRITIS PAIN PO) Take  by mouth 2 times a day.      albuterol 108 (90 Base) MCG/ACT Aero Soln inhalation aerosol INHALE 2 PUFFS BY MOUTH EVERY 6 HOURS AS NEEDED FOR SHORTNESS OF BREATH 8.5 Each 0    triamterene-hctz (MAXZIDE-25/DYAZIDE) 37.5-25 MG Tab TAKE 1 TABLET BY MOUTH EVERY DAY (Patient taking differently: Take 1 Tablet by mouth every day. Pt states she needs to be taking the 3.25, not 37.5.) 90 Tablet 0    ibuprofen (MOTRIN) 800 MG Tab Take 1 Tablet by mouth every 8 hours as needed. 30 Tablet 1    carvedilol (COREG) 3.125 MG Tab Take 1 Tablet by mouth 2 times a day with meals. 180 Tablet 3    lisinopril (PRINIVIL) 5 MG Tab Take 1 Tablet by mouth every day. 90 Tablet 3     No current facility-administered medications for this visit.     Allergies   Allergen Reactions    Celebrex [Celecoxib] Hives    Penicillins Hives     Full body.         Review of Systems   Constitutional: Negative for fever, chills and malaise/fatigue.   HENT: Negative for congestion.    Eyes: Negative for pain.   Respiratory: Negative for cough and shortness of breath.    Cardiovascular: Negative for leg swelling.   Gastrointestinal: Negative for nausea, vomiting, abdominal pain and diarrhea.   Genitourinary: Negative for dysuria and hematuria.   Skin: Negative for  "rash.   Neurological: Negative for dizziness, focal weakness and headaches.   Endo/Heme/Allergies: Does not bruise/bleed easily.   Psychiatric/Behavioral: Negative for depression.  The patient is not nervous/anxious.      Objective:     /66 (BP Location: Left arm, Patient Position: Sitting, BP Cuff Size: Large adult)   Pulse (!) 53   Temp 36.1 °C (97 °F) (Temporal)   Ht 1.581 m (5' 2.25\") Comment: pt reported  Wt 117 kg (259 lb)   SpO2 96%   BMI 46.99 kg/m²   Body mass index is 46.99 kg/m².  Wt Readings from Last 4 Encounters:   11/03/22 117 kg (259 lb)   11/08/21 (!) 132 kg (292 lb)   10/25/21 (!) 134 kg (294 lb 9.6 oz)   10/18/21 (!) 133 kg (292 lb 6.4 oz)       Physical Exam:  Constitutional: Well-developed and well-nourished. Not diaphoretic. No distress.   Skin: Skin is warm and dry. No rash noted.  Head: Atraumatic without lesions.  Eyes: Conjunctivae and extraocular motions are normal. Pupils are equal, round, and reactive to light. No scleral icterus.   Ears:  External ears unremarkable. Tympanic membranes clear and intact.  Nose: Nares patent. Septum midline. Turbinates without erythema nor edema. No discharge.   Mouth/Throat: Dentition is WNL. Tongue normal. Oropharynx is clear and moist. Posterior pharynx without erythema or exudates.  Neck: Supple, trachea midline. Normal range of motion. No thyromegaly present. No lymphadenopathy--cervical or supraclavicular.  Cardiovascular: Regular rate and rhythm, S1 and S2 without murmur, rubs, or gallops.  Lungs: Normal inspiratory effort, CTA bilaterally, no wheezes/rhonchi/rales  Abdomen: Soft, non tender, and without distention. Active bowel sounds in all four quadrants. No rebound, guarding, masses or HSM.  Extremities: No cyanosis, clubbing, erythema, nor edema. Distal pulses intact and symmetric.   Musculoskeletal: All major joints AROM full in all directions without pain.  Neurological: Alert and oriented x 3. DTRs 2+/3 and symmetric. No cranial " nerve deficit. 5/5 myotomes. Sensation intact. Negative Rhomberg.  Psychiatric:  Behavior, mood, and affect are appropriate.    A chaperone was offered to the patient during today's exam. Patient declined chaperone.    Assessment and Plan:     1. Need for vaccination  INFLUENZA VACCINE QUAD INJ (PF)    Pneumococcal Conjugate Vaccine 20-Valent (19 yrs+)      2. Wellness examination  Comp Metabolic Panel    Lipid Profile      3. Screening for colon cancer  Referral to GI for Colonoscopy      4. Encounter for screening mammogram for malignant neoplasm of breast  MA-SCREENING MAMMO BILAT W/TOMOSYNTHESIS W/CAD      5. Morbid obesity with BMI of 45.0-49.9, adult (HCC)  Patient identified as having weight management issue.  Appropriate orders and counseling given.          HCM: Conditions are overall stable.  Labs per orders  Immunizations per orders  Patient counseled about skin care, diet, supplements, prenatal vitamins, safe sex and exercise.    Follow-up: Return in about 1 year (around 11/3/2023), or if symptoms worsen or fail to improve, for Annual Physical.

## 2022-11-08 ENCOUNTER — TELEPHONE (OUTPATIENT)
Dept: MEDICAL GROUP | Facility: PHYSICIAN GROUP | Age: 57
End: 2022-11-08
Payer: COMMERCIAL

## 2022-11-08 NOTE — TELEPHONE ENCOUNTER
----- Message from SOLO Marr sent at 11/8/2022  7:09 AM PST -----  Lab Review: Overall labs are within normal limits but your liver enzymes are elevated as such I would like you to schedule a follow-up so that we can discuss and complete further work-up.  Please have patient schedule follow-up to discuss.

## 2022-11-08 NOTE — LETTER
November 11, 2022        Salome Garces,       We have been trying to contact you regarding recent lab results. Please call [phone] and ask to speak with Epifanio Myers's medical assistant. Thank you, have a good day.       Michelle Vega, Med Ass't                                  Michelle Vega, Med Ass't

## 2022-11-08 NOTE — TELEPHONE ENCOUNTER
Phone Number Called: [phone]     Call outcome: Left detailed message for patient. Informed to call back with any additional questions.    Message: Left vm for cb

## 2022-11-08 NOTE — TELEPHONE ENCOUNTER
Caller Name: Salome Garces    Call Back Number: [phone] (home)       How would the patient prefer to be contacted with a response: Phone call do NOT leave a detailed message    Pt returned our call. Gave pt her results and scheduled an appt for her to see Epifanio on 11/17/22.

## 2022-11-10 ENCOUNTER — TELEPHONE (OUTPATIENT)
Dept: MEDICAL GROUP | Facility: PHYSICIAN GROUP | Age: 57
End: 2022-11-10
Payer: COMMERCIAL

## 2022-11-10 NOTE — TELEPHONE ENCOUNTER
Phone Number Called: [phone] (home)       Call outcome: Spoke to patient regarding message below.    Message: Spoke with pt and let her know her [employer] Screening Forms are signed by the provider, scanned into her chart, and ready for her to  at the . Pt states she will them up at her next visit (11/17/22).

## 2022-11-17 ENCOUNTER — HOSPITAL ENCOUNTER (OUTPATIENT)
Dept: LAB | Facility: MEDICAL CENTER | Age: 57
End: 2022-11-17
Attending: NURSE PRACTITIONER
Payer: COMMERCIAL

## 2022-11-17 ENCOUNTER — OFFICE VISIT (OUTPATIENT)
Dept: MEDICAL GROUP | Facility: PHYSICIAN GROUP | Age: 57
End: 2022-11-17
Payer: COMMERCIAL

## 2022-11-17 VITALS
HEART RATE: 48 BPM | SYSTOLIC BLOOD PRESSURE: 104 MMHG | OXYGEN SATURATION: 96 % | WEIGHT: 261 LBS | TEMPERATURE: 97.3 F | BODY MASS INDEX: 46.25 KG/M2 | DIASTOLIC BLOOD PRESSURE: 58 MMHG | HEIGHT: 63 IN

## 2022-11-17 DIAGNOSIS — Z23 NEED FOR VACCINATION: ICD-10-CM

## 2022-11-17 DIAGNOSIS — I10 ESSENTIAL HYPERTENSION: ICD-10-CM

## 2022-11-17 DIAGNOSIS — R74.8 ELEVATED LIVER ENZYMES: ICD-10-CM

## 2022-11-17 LAB
BASOPHILS # BLD AUTO: 0.9 % (ref 0–1.8)
BASOPHILS # BLD: 0.06 K/UL (ref 0–0.12)
EOSINOPHIL # BLD AUTO: 0.11 K/UL (ref 0–0.51)
EOSINOPHIL NFR BLD: 1.6 % (ref 0–6.9)
ERYTHROCYTE [DISTWIDTH] IN BLOOD BY AUTOMATED COUNT: 44.6 FL (ref 35.9–50)
HCT VFR BLD AUTO: 46 % (ref 37–47)
HGB BLD-MCNC: 15 G/DL (ref 12–16)
IMM GRANULOCYTES # BLD AUTO: 0.02 K/UL (ref 0–0.11)
IMM GRANULOCYTES NFR BLD AUTO: 0.3 % (ref 0–0.9)
LYMPHOCYTES # BLD AUTO: 1.6 K/UL (ref 1–4.8)
LYMPHOCYTES NFR BLD: 23.9 % (ref 22–41)
MCH RBC QN AUTO: 30.2 PG (ref 27–33)
MCHC RBC AUTO-ENTMCNC: 32.6 G/DL (ref 33.6–35)
MCV RBC AUTO: 92.7 FL (ref 81.4–97.8)
MONOCYTES # BLD AUTO: 0.56 K/UL (ref 0–0.85)
MONOCYTES NFR BLD AUTO: 8.4 % (ref 0–13.4)
NEUTROPHILS # BLD AUTO: 4.35 K/UL (ref 2–7.15)
NEUTROPHILS NFR BLD: 64.9 % (ref 44–72)
NRBC # BLD AUTO: 0 K/UL
NRBC BLD-RTO: 0 /100 WBC
PLATELET # BLD AUTO: 265 K/UL (ref 164–446)
PMV BLD AUTO: 10.7 FL (ref 9–12.9)
RBC # BLD AUTO: 4.96 M/UL (ref 4.2–5.4)
WBC # BLD AUTO: 6.7 K/UL (ref 4.8–10.8)

## 2022-11-17 PROCEDURE — 85025 COMPLETE CBC W/AUTO DIFF WBC: CPT

## 2022-11-17 PROCEDURE — 36415 COLL VENOUS BLD VENIPUNCTURE: CPT

## 2022-11-17 PROCEDURE — 90471 IMMUNIZATION ADMIN: CPT | Performed by: NURSE PRACTITIONER

## 2022-11-17 PROCEDURE — 80074 ACUTE HEPATITIS PANEL: CPT

## 2022-11-17 PROCEDURE — 99214 OFFICE O/P EST MOD 30 MIN: CPT | Mod: 25 | Performed by: NURSE PRACTITIONER

## 2022-11-17 PROCEDURE — 90746 HEPB VACCINE 3 DOSE ADULT IM: CPT | Performed by: NURSE PRACTITIONER

## 2022-11-17 ASSESSMENT — ENCOUNTER SYMPTOMS
FEVER: 0
DIZZINESS: 0
CHILLS: 0
COUGH: 0
DEPRESSION: 0
HEARTBURN: 0
MYALGIAS: 0
PALPITATIONS: 0
HEADACHES: 0
VOMITING: 0
DIARRHEA: 0
SHORTNESS OF BREATH: 0
ABDOMINAL PAIN: 0
NAUSEA: 0

## 2022-11-17 NOTE — ASSESSMENT & PLAN NOTE
- Liver ultrasound  -Hepatitis panel  -Repeat CBC  -Consider further lab work-up based on findings.

## 2022-11-17 NOTE — PROGRESS NOTES
Subjective:     Chief Complaint   Patient presents with    Lab Results       HPI:   Salome presents today with     Problem   Elevated Liver Enzymes    This is a new issue.  Patient has mildly elevated liver enzymes of AST 60 and ALT 95.  Overall patient has minimal risk factors for hepatitis but does mention that she had a previous boyfriend who she believes cheated on her at 1 point she was told by a partner of his that he may have hepatitis.  There was no specification of which type of hepatitis the patient suspects hepatitis C would be more likely.  Did discuss risk factors including elevated cholesterol, BMI of 46.  Patient is working on weight loss.     Essential Hypertension    Chronic in nature.  Stable blood pressure is 104/58 today. Patient reports compliance with blood pressure medication and no side effects.  She admits to mild fatigue but relates this to long hours at work.  She denies vision changes, lightheadedness, dizziness, chest pressure, or palpitations.         Current Outpatient Medications Ordered in Epic   Medication Sig Dispense Refill    Glucosamine-Chondroitin (MOVE FREE PO) Take  by mouth every day.      Acetaminophen (TYLENOL ARTHRITIS PAIN PO) Take  by mouth 2 times a day.      albuterol 108 (90 Base) MCG/ACT Aero Soln inhalation aerosol INHALE 2 PUFFS BY MOUTH EVERY 6 HOURS AS NEEDED FOR SHORTNESS OF BREATH 8.5 Each 0    triamterene-hctz (MAXZIDE-25/DYAZIDE) 37.5-25 MG Tab TAKE 1 TABLET BY MOUTH EVERY DAY 90 Tablet 0    ibuprofen (MOTRIN) 800 MG Tab Take 1 Tablet by mouth every 8 hours as needed. 30 Tablet 1    carvedilol (COREG) 3.125 MG Tab Take 1 Tablet by mouth 2 times a day with meals. 180 Tablet 3    lisinopril (PRINIVIL) 5 MG Tab Take 1 Tablet by mouth every day. 90 Tablet 3     No current Epic-ordered facility-administered medications on file.       Health Maintenance: Completed    Review of Systems   Constitutional:  Negative for chills, fever and malaise/fatigue.   HENT:   "Negative for hearing loss.    Respiratory:  Negative for cough and shortness of breath.    Cardiovascular:  Negative for chest pain and palpitations.   Gastrointestinal:  Negative for abdominal pain, diarrhea, heartburn, nausea and vomiting.   Genitourinary:  Negative for dysuria.   Musculoskeletal:  Negative for myalgias.   Neurological:  Negative for dizziness and headaches.   Psychiatric/Behavioral:  Negative for depression and suicidal ideas.        Objective:     Exam:  /58 (BP Location: Left arm, Patient Position: Sitting, BP Cuff Size: Large adult)   Pulse (!) 48   Temp 36.3 °C (97.3 °F) (Temporal)   Ht 1.6 m (5' 3\") Comment: pt reported  Wt 118 kg (261 lb)   SpO2 96%   BMI 46.23 kg/m²  Body mass index is 46.23 kg/m².    Physical Exam  Constitutional:       Appearance: Normal appearance.   HENT:      Head: Normocephalic and atraumatic.   Eyes:      Pupils: Pupils are equal, round, and reactive to light.   Cardiovascular:      Rate and Rhythm: Normal rate and regular rhythm.      Pulses: Normal pulses.      Heart sounds: Normal heart sounds.   Pulmonary:      Effort: Pulmonary effort is normal.      Breath sounds: Normal breath sounds.   Abdominal:      General: There is no distension.      Tenderness: There is no abdominal tenderness.   Musculoskeletal:         General: Normal range of motion.   Skin:     General: Skin is warm and dry.      Capillary Refill: Capillary refill takes less than 2 seconds.   Neurological:      General: No focal deficit present.      Mental Status: She is alert and oriented to person, place, and time.     Assessment & Plan:     57 y.o. female with the following -     Problem List Items Addressed This Visit       Elevated liver enzymes     - Liver ultrasound  -Hepatitis panel  -Repeat CBC  -Consider further lab work-up based on findings.         Relevant Orders    US-RUQ    HEPATITIS PANEL ACUTE(4 COMPONENTS)    Essential hypertension     - Continue lisinopril 5 mg, " triamterene hydrochlorothiazide, Coreg as prescribed.          Other Visit Diagnoses       Need for vaccination                  Return in about 1 month (around 12/17/2022), or if symptoms worsen or fail to improve, for Lab Review.    I have placed the below orders and discussed them with an approved delegating provider. The MA is performing the below orders under the direction of Dr. Garcia.     Please note that this dictation was created using voice recognition software. I have made every reasonable attempt to correct obvious errors, but I expect that there are errors of grammar and possibly content that I did not discover before finalizing the note.

## 2022-11-18 LAB
HAV IGM SERPL QL IA: NORMAL
HBV CORE IGM SER QL: NORMAL
HBV SURFACE AG SER QL: NORMAL
HCV AB SER QL: NORMAL

## 2022-12-18 ENCOUNTER — HOSPITAL ENCOUNTER (OUTPATIENT)
Dept: RADIOLOGY | Facility: MEDICAL CENTER | Age: 57
End: 2022-12-18
Attending: NURSE PRACTITIONER
Payer: COMMERCIAL

## 2022-12-18 DIAGNOSIS — R74.8 ELEVATED LIVER ENZYMES: ICD-10-CM

## 2022-12-18 PROCEDURE — 76705 ECHO EXAM OF ABDOMEN: CPT

## 2022-12-20 ENCOUNTER — NON-PROVIDER VISIT (OUTPATIENT)
Dept: MEDICAL GROUP | Facility: PHYSICIAN GROUP | Age: 57
End: 2022-12-20
Payer: COMMERCIAL

## 2022-12-20 DIAGNOSIS — Z23 NEED FOR VACCINATION: ICD-10-CM

## 2022-12-20 PROCEDURE — 90471 IMMUNIZATION ADMIN: CPT | Performed by: NURSE PRACTITIONER

## 2022-12-20 PROCEDURE — 90746 HEPB VACCINE 3 DOSE ADULT IM: CPT | Performed by: NURSE PRACTITIONER

## 2022-12-20 NOTE — PROGRESS NOTES
"Salome Gacres is a 57 y.o. female here for a non-provider visit for:   HEPATITIS B 2 of 3    Reason for immunization: continue or complete series started at the office  Immunization records indicate need for vaccine: Yes, confirmed with Epic  Minimum interval has been met for this vaccine: Yes  ABN completed: Not Indicated    VIS Dated  10/15/21 was given to patient: Yes  All IAC Questionnaire questions were answered \"No.\"    Patient tolerated injection and no adverse effects were observed or reported: Yes    Pt scheduled for next dose in series: No  "

## 2023-01-12 ENCOUNTER — OFFICE VISIT (OUTPATIENT)
Dept: MEDICAL GROUP | Facility: PHYSICIAN GROUP | Age: 58
End: 2023-01-12
Payer: COMMERCIAL

## 2023-01-12 ENCOUNTER — HOSPITAL ENCOUNTER (OUTPATIENT)
Dept: LAB | Facility: MEDICAL CENTER | Age: 58
End: 2023-01-12
Attending: NURSE PRACTITIONER
Payer: COMMERCIAL

## 2023-01-12 VITALS
TEMPERATURE: 97.6 F | DIASTOLIC BLOOD PRESSURE: 70 MMHG | HEIGHT: 63 IN | SYSTOLIC BLOOD PRESSURE: 132 MMHG | BODY MASS INDEX: 45.89 KG/M2 | HEART RATE: 56 BPM | OXYGEN SATURATION: 94 % | WEIGHT: 259 LBS

## 2023-01-12 DIAGNOSIS — K76.0 NAFLD (NONALCOHOLIC FATTY LIVER DISEASE): ICD-10-CM

## 2023-01-12 DIAGNOSIS — I10 ESSENTIAL HYPERTENSION: ICD-10-CM

## 2023-01-12 DIAGNOSIS — G47.33 OBSTRUCTIVE SLEEP APNEA SYNDROME: Chronic | ICD-10-CM

## 2023-01-12 DIAGNOSIS — R73.09 ELEVATED GLUCOSE: ICD-10-CM

## 2023-01-12 DIAGNOSIS — J45.20 MILD INTERMITTENT ASTHMA WITHOUT COMPLICATION: ICD-10-CM

## 2023-01-12 LAB
ALBUMIN SERPL BCP-MCNC: 4.4 G/DL (ref 3.2–4.9)
ALBUMIN/GLOB SERPL: 1.4 G/DL
ALP SERPL-CCNC: 98 U/L (ref 30–99)
ALT SERPL-CCNC: 104 U/L (ref 2–50)
ANION GAP SERPL CALC-SCNC: 11 MMOL/L (ref 7–16)
AST SERPL-CCNC: 55 U/L (ref 12–45)
BILIRUB SERPL-MCNC: 0.7 MG/DL (ref 0.1–1.5)
BUN SERPL-MCNC: 18 MG/DL (ref 8–22)
CALCIUM ALBUM COR SERPL-MCNC: 9.8 MG/DL (ref 8.5–10.5)
CALCIUM SERPL-MCNC: 10.1 MG/DL (ref 8.5–10.5)
CHLORIDE SERPL-SCNC: 98 MMOL/L (ref 96–112)
CO2 SERPL-SCNC: 29 MMOL/L (ref 20–33)
CREAT SERPL-MCNC: 0.69 MG/DL (ref 0.5–1.4)
EST. AVERAGE GLUCOSE BLD GHB EST-MCNC: 100 MG/DL
GFR SERPLBLD CREATININE-BSD FMLA CKD-EPI: 102 ML/MIN/1.73 M 2
GLOBULIN SER CALC-MCNC: 3.2 G/DL (ref 1.9–3.5)
GLUCOSE SERPL-MCNC: 93 MG/DL (ref 65–99)
HBA1C MFR BLD: 5.1 % (ref 4–5.6)
POTASSIUM SERPL-SCNC: 5 MMOL/L (ref 3.6–5.5)
PROT SERPL-MCNC: 7.6 G/DL (ref 6–8.2)
SODIUM SERPL-SCNC: 138 MMOL/L (ref 135–145)

## 2023-01-12 PROCEDURE — 80053 COMPREHEN METABOLIC PANEL: CPT

## 2023-01-12 PROCEDURE — 99214 OFFICE O/P EST MOD 30 MIN: CPT | Performed by: NURSE PRACTITIONER

## 2023-01-12 PROCEDURE — 83036 HEMOGLOBIN GLYCOSYLATED A1C: CPT

## 2023-01-12 PROCEDURE — 36415 COLL VENOUS BLD VENIPUNCTURE: CPT

## 2023-01-12 ASSESSMENT — ENCOUNTER SYMPTOMS
WHEEZING: 0
COUGH: 0
DIZZINESS: 0
FEVER: 0
SHORTNESS OF BREATH: 0
CHILLS: 0
HEADACHES: 0
PALPITATIONS: 0
DEPRESSION: 0

## 2023-01-12 ASSESSMENT — FIBROSIS 4 INDEX: FIB4 SCORE: 1.3

## 2023-01-12 ASSESSMENT — PATIENT HEALTH QUESTIONNAIRE - PHQ9: CLINICAL INTERPRETATION OF PHQ2 SCORE: 0

## 2023-01-12 NOTE — PROGRESS NOTES
Subjective:     Chief Complaint   Patient presents with    Follow-Up     Liver Issues, Fatty Liver disease     Medication Refill     Ibuprofen 800 mg        HPI:   Salome presents today with     Problem   Nafld (Nonalcoholic Fatty Liver Disease)    Continued issue.  She is here for follow-up, did not complete updated labs a little bit or at this time.  Ultrasound does indicate fatty liver.  Discussed order for FibroScan.     Essential Hypertension    Chronic in nature.  Stable blood pressure is 132/70 today. Patient reports compliance with blood pressure medication and no side effects.  She admits to mild fatigue but relates this to long hours at work.  She denies vision changes, lightheadedness, dizziness, chest pressure, or palpitations.     Mild Intermittent Asthma Without Complication    Chronic in nature.  Stable.  Patient states she continues to use albuterol as needed, about once per day, for exercise and cold weather induced bronchospasms. States that she is doing well in terms of breathing. Used it a few days ago related to cold. Patient denies any racing heartbeat, tremors, lightheadedness or other side effects from the albuterol.      Obstructive Sleep Apnea Syndrome    Chronic in nature..  Patient states that her machine has been having issues, she is requesting that we order a new machine.  I did discuss with her that he may need to follow-up with pulmonology regarding this.  States that symptoms are stable she is not having any issues with cough, difficulty that overall she is feeling well because her machine has continued to work though she is concerned that we will stop working.  She states she continues to sleep very well with the current settings.         Current Outpatient Medications Ordered in Epic   Medication Sig Dispense Refill    NON SPECIFIED New CPAP machine, mask and tubing. 1 Each 0    carvedilol (COREG) 3.125 MG Tab TAKE 1 TABLET BY MOUTH TWICE A DAY WITH MEALS 180 Tablet 3     "lisinopril (PRINIVIL) 5 MG Tab TAKE 1 TABLET BY MOUTH EVERY DAY 90 Tablet 3    Glucosamine-Chondroitin (MOVE FREE PO) Take  by mouth every day.      Acetaminophen (TYLENOL ARTHRITIS PAIN PO) Take  by mouth 2 times a day.      albuterol 108 (90 Base) MCG/ACT Aero Soln inhalation aerosol INHALE 2 PUFFS BY MOUTH EVERY 6 HOURS AS NEEDED FOR SHORTNESS OF BREATH 8.5 Each 0    triamterene-hctz (MAXZIDE-25/DYAZIDE) 37.5-25 MG Tab TAKE 1 TABLET BY MOUTH EVERY DAY 90 Tablet 0    ibuprofen (MOTRIN) 800 MG Tab Take 1 Tablet by mouth every 8 hours as needed. 30 Tablet 1     No current Epic-ordered facility-administered medications on file.       Health Maintenance: Completed    Review of Systems   Constitutional:  Negative for chills and fever.   Respiratory:  Negative for cough, shortness of breath and wheezing.    Cardiovascular:  Negative for chest pain and palpitations.   Neurological:  Negative for dizziness and headaches.   Psychiatric/Behavioral:  Negative for depression and suicidal ideas.        Objective:     Exam:  /70 (BP Location: Right arm, Patient Position: Sitting, BP Cuff Size: Adult)   Pulse (!) 56   Temp 36.4 °C (97.6 °F) (Temporal)   Ht 1.6 m (5' 3\")   Wt 117 kg (259 lb)   SpO2 94%   BMI 45.88 kg/m²  Body mass index is 45.88 kg/m².    Constitutional: Alert, no distress, well-groomed.  Skin: Warm, dry, good turgor, no rashes in visible areas.  Eye: Equal, round and reactive, conjunctiva clear, lids normal.  ENMT: Lips without lesions, good dentition, moist mucous membranes.  Neck: Trachea midline, no masses, no thyromegaly.  Respiratory: Unlabored respiratory effort, no cough.  MSK: Normal gait, moves all extremities.  Neuro: Grossly non-focal.   Psych: Alert and oriented x3, normal affect and mood.    Assessment & Plan:     57 y.o. female with the following -     Problem List Items Addressed This Visit       Obstructive sleep apnea syndrome (Chronic)     - Last sleep study in 2019, try to order " CPAP machine from [facility]  -Prescription written.  -Referral to sleep medicine referral placed to sleep medicine as patient may need to follow-up with them         Relevant Medications    NON SPECIFIED    Other Relevant Orders    Referral to Pulmonary and Sleep Medicine    Essential hypertension     -continue lisinopril 5 mg and carvedilol 3.125 mg twice daily          Relevant Orders    Comp Metabolic Panel (Completed)    HEMOGLOBIN A1C (Completed)    Mild intermittent asthma without complication     -continue albuterol as needed.          NAFLD (nonalcoholic fatty liver disease)     -Referral to gastroenterology for FibroScan only  -Labs ordered for update         Relevant Orders    Comp Metabolic Panel (Completed)    Referral to Gastroenterology     Other Visit Diagnoses       Elevated glucose                Return in about 3 months (around 4/12/2023), or if symptoms worsen or fail to improve.    I have placed the below orders and discussed them with an approved delegating provider. The MA is performing the below orders under the direction of Dr. Garcia.     Please note that this dictation was created using voice recognition software. I have made every reasonable attempt to correct obvious errors, but I expect that there are errors of grammar and possibly content that I did not discover before finalizing the note.

## 2023-01-12 NOTE — ASSESSMENT & PLAN NOTE
- Last sleep study in 2019, try to order CPAP machine from [facility].  -Prescription written.  -Referral to sleep medicine referral placed to sleep medicine as patient may need to follow-up with them

## 2023-02-09 ENCOUNTER — TELEMEDICINE (OUTPATIENT)
Dept: SLEEP MEDICINE | Facility: MEDICAL CENTER | Age: 58
End: 2023-02-09
Attending: NURSE PRACTITIONER
Payer: COMMERCIAL

## 2023-02-09 VITALS — WEIGHT: 259 LBS | HEIGHT: 63 IN | BODY MASS INDEX: 45.89 KG/M2

## 2023-02-09 DIAGNOSIS — G47.33 OSA ON CPAP: ICD-10-CM

## 2023-02-09 DIAGNOSIS — G47.34 NOCTURNAL HYPOXIA: ICD-10-CM

## 2023-02-09 PROCEDURE — 99203 OFFICE O/P NEW LOW 30 MIN: CPT | Performed by: PREVENTIVE MEDICINE

## 2023-02-09 ASSESSMENT — FIBROSIS 4 INDEX: FIB4 SCORE: 1.14

## 2023-02-09 NOTE — PROGRESS NOTES
"CHIEF COMPLIANT: \"How did I do on my sleep study?\"   LAST SEEN:  Dr. Steele 01/2018, Ekaterina Paiz  04/2019  HISTORY OF PRESENT ILLNESS:  Salome Garces is a 57 y.o.female who is here to establish care. She was using CPAP 16 2L bleed in. Lost approximately 40lbs since last study. She is requesting a change in DME as she does not want to use [employer].       COMPLIANCE DATA: 100% greater than 4 hours  Machine type: S9 [employer]oset   Date range: 01/11/23-02/09/23  AHI:1.0  TIME USED: 8hrs 30mins   PRESSURE SETTINGS: CPAP 16cm H20  LEAK: 34.7  OTHER: [facility]     Sleep study results:  Novsome sleep study   Night 1, night 2, night 3   TYPE: HST   DATE: 02/03/2019  Diagnostic:KIKE 76.4, 37.7, 44.7 average 52.5  Diagnostic  Oxygen Shabbir: average 73%  Average oxygen saturation less than 90 138mins       Significant comorbidities and modifying factors: see below     PAST MEDICAL HISTORY:  Past Medical History:   Diagnosis Date    Allergy     Anemia     Arthritis     osteo RIGHT knee    Asthma 12/28/2017    Inhalers PRN    Bronchitis 10/2017    Congestive heart failure (HCC) 2013    Heart cath done-was [place]. NO further follow up and no cardiologist.    Dental disorder 12/28/2017    Upper and lower dentures.    Dependence on nocturnal oxygen therapy 07/2020    2 L    Enlarged heart     Excessive bleeding in premenopausal period 4/16/2018    D[place] measles     Hypertension     Obesity     Pain 07/2020    right knee    Sleep apnea     CPAP With O2 @ 2L/NC.    Snoring       PROBLEM LIST:  Patient Active Problem List    Diagnosis Date Noted    NAFLD (nonalcoholic fatty liver disease) 11/17/2022    Acute diffuse otitis externa of both ears 10/18/2021    Morbid obesity with BMI of 45.0-49.9, adult (HCC) 06/11/2020    Situational depression 10/15/2018    Endometriosis 04/16/2018    Essential hypertension 10/16/2017    Mild intermittent asthma without complication 10/16/2017    Enlarged heart 10/16/2017    Iron " deficiency anemia secondary to inadequate dietary iron intake 10/16/2017    Murmur 10/16/2017    Obstructive sleep apnea syndrome 10/16/2017     PAST SOCIAL HISTORY:  Past Surgical History:   Procedure Laterality Date    COLONOSCOPY  1/8/2018    Procedure: COLONOSCOPY;  Surgeon: Dusty Gupta M.D.;  Location: SURGERY [facility];  Service: EUS    COLONOSCOPY WITH BIOPSY  1/8/2018    Procedure: COLONOSCOPY WITH BIOPSY;  Surgeon: Dusty Gupta M.D.;  Location: SURGERY [facility];  Service: EUS    CARDIAC CATH, RIGHT/LEFT HEART  2013    Negative    REPEAT C SECTION  04/21/1988    TUBAL LIGATION Bilateral 04/21/1988    PRIMARY C SECTION  01/30/1987     PAST FAMILY HISTORY:  Family History   Problem Relation Age of Onset    Lung Disease Mother         COPD     Arthritis Mother     Psychiatric Illness Mother         depression, suicide    Cancer Father     Other Father         hypoglycemia    Heart Disease Maternal Aunt 64    Cancer Maternal Aunt         breast cancer    Heart Disease Maternal Grandmother         pacer     SOCIAL HISTORY:  Social History     Socioeconomic History    Marital status:      Spouse name: Not on file    Number of children: Not on file    Years of education: Not on file    Highest education level: Not on file   Occupational History    Not on file   Tobacco Use    Smoking status: Never    Smokeless tobacco: Never   Vaping Use    Vaping Use: Never used   Substance and Sexual Activity    Alcohol use: No    Drug use: Never    Sexual activity: Yes     Partners: Male     Birth control/protection: Condom   Other Topics Concern    Not on file   Social History Narrative    Not on file     Social Determinants of Health     Financial Resource Strain: Not on file   Food Insecurity: Not on file   Transportation Needs: Not on file   Physical Activity: Not on file   Stress: Not on file   Social Connections: Not on file   Intimate Partner Violence: Not on file   Housing Stability: Not on file  "    ALLERGIES: Celebrex [celecoxib] and Penicillins  MEDICATIONS:  Current Outpatient Medications   Medication Sig Dispense Refill    NON SPECIFIED New CPAP machine, mask and tubing. 1 Each 0    carvedilol (COREG) 3.125 MG Tab TAKE 1 TABLET BY MOUTH TWICE A DAY WITH MEALS 180 Tablet 3    lisinopril (PRINIVIL) 5 MG Tab TAKE 1 TABLET BY MOUTH EVERY DAY 90 Tablet 3    Glucosamine-Chondroitin (MOVE FREE PO) Take  by mouth every day.      Acetaminophen (TYLENOL ARTHRITIS PAIN PO) Take  by mouth 2 times a day.      albuterol 108 (90 Base) MCG/ACT Aero Soln inhalation aerosol INHALE 2 PUFFS BY MOUTH EVERY 6 HOURS AS NEEDED FOR SHORTNESS OF BREATH 8.5 Each 0    triamterene-hctz (MAXZIDE-25/DYAZIDE) 37.5-25 MG Tab TAKE 1 TABLET BY MOUTH EVERY DAY 90 Tablet 0    ibuprofen (MOTRIN) 800 MG Tab Take 1 Tablet by mouth every 8 hours as needed. 30 Tablet 1     No current facility-administered medications for this visit.    \"CURRENT RX\"    REVIEW OF SYSTEMS:  Constitutional: Denies weight loss, endorses chronic daytime fatigue  Eyes: Denies vision changes  Ears/Nose/Mouth/Throat: Denies rhinitis/nasal congestion, injury, decayed teeth/toothaches.  Cardiovascular: Denies chest pain, tightness, palpitations, swelling in legs/feet, difficulty breathing when lying down but gets better when sitting up.   Respiratory: Denies shortness of breath while awake,  Sleep: per HPI  Gastrointestinal: Denies  difficulty swallowing,  heartburn.  Genitourinary: REPORTS nocturia  Musculoskeletal: Denies painful joints, sore muscles, back pain.   Neurological: Denies frequent headaches,weakness, dizziness.    PHYSICAL EXAM/VITALS:  Ht 1.6 m (5' 3\")   Wt 117 kg (259 lb)   BMI 45.88 kg/m²   Appearance: Well-nourished, well-developed,  looks stated age, no acute distress  Eyes:   EOMI  ENMT: MASKED  Neck: Supple, trachea midline  Respiratory effort:  No intercostal retractions or use of accessory muscles  Musculoskeletal:  Grossly normal; gait and " station normal  Neurologic:  oriented to person, time, place, and purpose; judgement intact  Psychiatric:  No depression, anxiety, agitation      MEDICAL DECISION MAKING:  The medical record was reviewed as it pertains to this referral. This includes records from primary care,consultants notes, referral request, hospital records, labs and imaging. Any available diagnostic and titration nocturnal polysomnograms, home sleep apnea tests, continuous nocturnal oximetry results, multiple sleep latency tests, and recent compliance reports were reviewed with the patient.    ASSESSMENT/PLAN:  Salome Garces is a 57 y.o.female who is benefiting from PAP therapy and using it successfully.       1. JUNIOR on CPAP  - DME Mask Fitting; Future  - DME Mask and Supplies    2. Nocturnal hypoxia  - DME Mask Fitting; Future  - DME Mask and Supplies        The risks of untreated sleep apnea were discussed with the patient at length. Patients with JUNIOR are at increased risk of cardiovascular disease including coronary artery disease, systemic arterial hypertension, pulmonary arterial hypertension, cardiac arrhythmias, and stroke. JUNIOR patients have an increased risk of motor vehicle accidents, type 2 diabetes, chronic kidney disease, and non-alcoholic liver disease. The patient was advised to avoid driving a motor vehicle when drowsy.  Have advised the patient to follow up with the appropriate healthcare practitioners for all other medical problems and issues.    RETURN TO CLINIC: Return in about 1 year (around 2/9/2024) for Annual visit.    My total time spent caring for the patient on the day of the encounter was 40 minutes. This includes time spent on a thorough chart review including other physician notes, all sleep studies, as well as critical labs and pulmonary and cardiac studies.  Additionally, it includes a thorough discussion of good sleep hygiene and stimulus control, as well as  the need for consistency in terms of sleep  preparation and practice.    Please note that this dictation was created using voice recognition software.  I have made every reasonable attempt to correct obvious errors, I expect that there are errors of grammar and possibly content that I did not discover before finalizing this note.

## 2023-02-18 DIAGNOSIS — I10 ESSENTIAL HYPERTENSION: ICD-10-CM

## 2023-02-23 RX ORDER — TRIAMTERENE AND HYDROCHLOROTHIAZIDE 37.5; 25 MG/1; MG/1
1 TABLET ORAL DAILY
Qty: 90 TABLET | Refills: 0 | Status: SHIPPED | OUTPATIENT
Start: 2023-02-23 | End: 2023-05-15

## 2023-05-13 DIAGNOSIS — I10 ESSENTIAL HYPERTENSION: ICD-10-CM

## 2023-05-13 PROCEDURE — 1126F AMNT PAIN NOTED NONE PRSNT: CPT | Performed by: NURSE PRACTITIONER

## 2023-05-15 RX ORDER — TRIAMTERENE AND HYDROCHLOROTHIAZIDE 37.5; 25 MG/1; MG/1
1 TABLET ORAL DAILY
Qty: 90 TABLET | Refills: 0 | Status: SHIPPED | OUTPATIENT
Start: 2023-05-15 | End: 2023-08-30 | Stop reason: SDUPTHER

## 2023-05-22 ENCOUNTER — NON-PROVIDER VISIT (OUTPATIENT)
Dept: MEDICAL GROUP | Facility: PHYSICIAN GROUP | Age: 58
End: 2023-05-22
Payer: COMMERCIAL

## 2023-05-22 ENCOUNTER — TELEPHONE (OUTPATIENT)
Dept: MEDICAL GROUP | Facility: PHYSICIAN GROUP | Age: 58
End: 2023-05-22

## 2023-05-22 DIAGNOSIS — Z23 NEED FOR VACCINATION: ICD-10-CM

## 2023-05-22 PROCEDURE — 90746 HEPB VACCINE 3 DOSE ADULT IM: CPT | Performed by: NURSE PRACTITIONER

## 2023-05-22 PROCEDURE — 90471 IMMUNIZATION ADMIN: CPT | Performed by: NURSE PRACTITIONER

## 2023-05-22 NOTE — PROGRESS NOTES
"Salome Garces is a 57 y.o. female here for a non-provider visit for:   HEPATITIS B 3 of 3    Reason for immunization: continue or complete series started at the office  Immunization records indicate need for vaccine: Yes, confirmed with Epic  Minimum interval has been met for this vaccine: Yes  ABN completed: Not Indicated    VIS Dated  10/15/21 was given to patient: Yes  All IAC Questionnaire questions were answered \"No.\"    Patient tolerated injection and no adverse effects were observed or reported: Yes    Pt scheduled for next dose in series: No  "

## 2023-08-30 DIAGNOSIS — I10 ESSENTIAL HYPERTENSION: ICD-10-CM

## 2023-08-30 DIAGNOSIS — J45.20 MILD INTERMITTENT ASTHMA WITHOUT COMPLICATION: ICD-10-CM

## 2023-08-30 RX ORDER — TRIAMTERENE AND HYDROCHLOROTHIAZIDE 37.5; 25 MG/1; MG/1
1 TABLET ORAL DAILY
Qty: 90 TABLET | Refills: 0 | Status: SHIPPED | OUTPATIENT
Start: 2023-08-30 | End: 2023-08-30 | Stop reason: SDUPTHER

## 2023-08-30 RX ORDER — LISINOPRIL 5 MG/1
5 TABLET ORAL
Qty: 90 TABLET | Refills: 0 | Status: SHIPPED | OUTPATIENT
Start: 2023-08-30

## 2023-08-30 RX ORDER — CARVEDILOL 3.12 MG/1
3.12 TABLET ORAL 2 TIMES DAILY WITH MEALS
Qty: 180 TABLET | Refills: 0 | Status: SHIPPED | OUTPATIENT
Start: 2023-08-30 | End: 2023-08-30 | Stop reason: SDUPTHER

## 2023-08-30 RX ORDER — ALBUTEROL SULFATE 90 UG/1
2 AEROSOL, METERED RESPIRATORY (INHALATION) EVERY 6 HOURS PRN
Qty: 8.5 EACH | Refills: 0 | Status: SHIPPED | OUTPATIENT
Start: 2023-08-30

## 2023-08-30 RX ORDER — TRIAMTERENE AND HYDROCHLOROTHIAZIDE 37.5; 25 MG/1; MG/1
1 TABLET ORAL DAILY
Qty: 90 TABLET | Refills: 0 | Status: SHIPPED | OUTPATIENT
Start: 2023-08-30

## 2023-08-30 RX ORDER — ALBUTEROL SULFATE 90 UG/1
2 AEROSOL, METERED RESPIRATORY (INHALATION) EVERY 6 HOURS PRN
Qty: 8.5 EACH | Refills: 0 | Status: SHIPPED | OUTPATIENT
Start: 2023-08-30 | End: 2023-08-30 | Stop reason: SDUPTHER

## 2023-08-30 RX ORDER — LISINOPRIL 5 MG/1
5 TABLET ORAL
Qty: 90 TABLET | Refills: 0 | Status: SHIPPED | OUTPATIENT
Start: 2023-08-30 | End: 2023-08-30 | Stop reason: SDUPTHER

## 2023-08-30 RX ORDER — CARVEDILOL 3.12 MG/1
3.12 TABLET ORAL 2 TIMES DAILY WITH MEALS
Qty: 180 TABLET | Refills: 0 | Status: SHIPPED | OUTPATIENT
Start: 2023-08-30

## 2023-08-30 NOTE — TELEPHONE ENCOUNTER
Received request via: Patient    Was the patient seen in the last year in this department? Yes    Does the patient have an active prescription (recently filled or refills available) for medication(s) requested?  Pt is requesting medications be sent to [facility] in [facility]     Does the patient have longterm Plus and need 100 day supply (blood pressure, diabetes and cholesterol meds only)? Patient does not have SCP

## 2023-11-06 ENCOUNTER — APPOINTMENT (OUTPATIENT)
Dept: MEDICAL GROUP | Facility: PHYSICIAN GROUP | Age: 58
End: 2023-11-06
Payer: COMMERCIAL

## (undated) DEVICE — SYRINGE DISP. 50CC LS - (40/BX)

## (undated) DEVICE — TUBING CLEARLINK DUO-VENT - C-FLO (48EA/CA)

## (undated) DEVICE — BASIN EMESIS DISP. - (250/CA)

## (undated) DEVICE — LACTATED RINGERS INJ 1000 ML - (14EA/CA 60CA/PF)

## (undated) DEVICE — CANISTER SUCTION RIGID RED 1500CC (40EA/CA)

## (undated) DEVICE — TUBE CONNECTING SUCTION - CLEAR PLASTIC STERILE 72 IN (50EA/CA)

## (undated) DEVICE — ELECTRODE 850 FOAM ADHESIVE - HYDROGEL RADIOTRNSPRNT (50/PK)

## (undated) DEVICE — GOLD PROBE 10FR (5/BX)

## (undated) DEVICE — GOWN SURGEONS X-LARGE - DISP. (30/CA)

## (undated) DEVICE — MASK ANESTHESIA ADULT  - (100/CA)

## (undated) DEVICE — KIT  I.V. START (100EA/CA)

## (undated) DEVICE — CATHETER IV SAFETY 20 GA X 1-1/4 (50/BX)

## (undated) DEVICE — TRAP POLYP E-TRAP (25EA/BX)

## (undated) DEVICE — SPONGE GAUZE NON-STERILE 4X4 - (2000/CA 10PK/CA)

## (undated) DEVICE — GLOVE, LITE (PAIR)

## (undated) DEVICE — NEPTUNE 4 PORT MANIFOLD - (20/PK)

## (undated) DEVICE — CANNULA W/ SUPPLY TUBING O2 - (50/CA)

## (undated) DEVICE — SET EXTENSION WITH 2 PORTS (48EA/CA) ***PART #2C8610 IS A SUBSTITUTE*****

## (undated) DEVICE — PAD PREP 24 X 48 CUFFED - (100/CA)

## (undated) DEVICE — SENSOR SPO2 ADULT LNCS ADTX (20/BX) ORDER ITEM #19593